# Patient Record
Sex: FEMALE | Race: WHITE | HISPANIC OR LATINO
[De-identification: names, ages, dates, MRNs, and addresses within clinical notes are randomized per-mention and may not be internally consistent; named-entity substitution may affect disease eponyms.]

---

## 2022-06-01 ENCOUNTER — APPOINTMENT (OUTPATIENT)
Dept: UROLOGY | Facility: CLINIC | Age: 77
End: 2022-06-01
Payer: MEDICARE

## 2022-06-01 VITALS
WEIGHT: 180 LBS | SYSTOLIC BLOOD PRESSURE: 127 MMHG | BODY MASS INDEX: 25.77 KG/M2 | DIASTOLIC BLOOD PRESSURE: 79 MMHG | RESPIRATION RATE: 16 BRPM | HEART RATE: 85 BPM | TEMPERATURE: 97.2 F | OXYGEN SATURATION: 98 % | HEIGHT: 70 IN

## 2022-06-01 PROCEDURE — 99204 OFFICE O/P NEW MOD 45 MIN: CPT

## 2022-06-01 RX ORDER — SULFAMETHOXAZOLE AND TRIMETHOPRIM 800; 160 MG/1; MG/1
800-160 TABLET ORAL
Qty: 6 | Refills: 0 | Status: ACTIVE | COMMUNITY
Start: 2022-06-01 | End: 1900-01-01

## 2022-06-01 NOTE — ASSESSMENT
[FreeTextEntry1] : Ms Díaz is a 76 y.o. F with LUTS, worse in the past 1 week.\par Dipstick UA positive for infection.\par Regarding her baseline symptoms, we discussed the following options for managing the patient's lower urinary tract symptoms (LUTS) attributed to overactive bladder (OAB)\par (1) Behavioral modification: timed and double voiding, reduced oral fluid intake at night, avoid bladder irritants (caffeine, alcohol, smoking, spicy foods), bladder training\par (2) Medical therapy: Anticholinergics or B-3 agonists. Discussed mechanisms of action and side effect profile\par (3) Procedures: Botox, PTNS, interstim\par  \par - UA, UCx\par - Empiric bactrim x 3 days based on positive dipstick UA, symptoms\par - Trospium 20mg prescribed. Asked her to start ~3 days following completion of antibiotics. Side effect profile reviewed. Discussed this is a quaternary amine and therefore less likely to penetrate blood brain barrier\par - RV 4-6 weeks

## 2022-06-01 NOTE — LETTER BODY
[Dear  ___] : Dear  [unfilled], [Consult Letter:] : I had the pleasure of evaluating your patient, [unfilled]. [Please see my note below.] : Please see my note below. [Consult Closing:] : Thank you very much for allowing me to participate in the care of this patient.  If you have any questions, please do not hesitate to contact me. [Sincerely,] : Sincerely, [FreeTextEntry2] : Gal Noel MD\par [address]\par [facility], [place]\par [address] [FreeTextEntry3] : Denzel Balderas MD\par [facility]\par [address] [place] [address], [address]\par [place], [place] [address]\par p: [phone]\par f: [phone]

## 2022-06-01 NOTE — HISTORY OF PRESENT ILLNESS
[FreeTextEntry1] : BEV LANDRUM is a 76 year F who presents today as a new patient evaluation for LUTS.\par \par Her symptoms include daytime frequency q2 hours, + urgency with UI. Nocturia x 4. She wears 2 panty-liners / day.  This is been going on for the past 1 year, however things have gotten worse over the past week.  No dysuria.  No gross hematuria.  Did have a UTI approximately 10 years ago.\par She drinks coffee - twice / day. Glass of wine rarely. Nothing else with caffeine.\par Snores a lot. No leg swelling. \par \par PMH: HTN, hypothyroid\par PSH:\par Uterine removal for prolapse, lumpectomy, Knee surgery x 2\par \par Denies gross hematuria, flank pain, fevers, chills, nausea, vomiting.

## 2022-06-01 NOTE — PHYSICAL EXAM
[Normal Appearance] : normal appearance [Well Groomed] : well groomed [Edema] : no peripheral edema [Exaggerated Use Of Accessory Muscles For Inspiration] : no accessory muscle use [Abdomen Tenderness] : non-tender [Costovertebral Angle Tenderness] : no ~M costovertebral angle tenderness [Normal Station and Gait] : the gait and station were normal for the patient's age [Skin Color & Pigmentation] : normal skin color and pigmentation [Sensation] : the sensory exam was normal to light touch and pinprick [Oriented To Time, Place, And Person] : oriented to person, place, and time [No Palpable Adenopathy] : no palpable adenopathy

## 2022-06-05 LAB — BACTERIA UR CULT: ABNORMAL

## 2022-06-05 RX ORDER — CEPHALEXIN 500 MG/1
500 CAPSULE ORAL
Qty: 10 | Refills: 0 | Status: ACTIVE | COMMUNITY
Start: 2022-06-05 | End: 1900-01-01

## 2022-07-13 ENCOUNTER — RX RENEWAL (OUTPATIENT)
Age: 77
End: 2022-07-13

## 2022-07-19 ENCOUNTER — APPOINTMENT (OUTPATIENT)
Dept: UROLOGY | Facility: CLINIC | Age: 77
End: 2022-07-19

## 2022-07-19 VITALS
OXYGEN SATURATION: 96 % | BODY MASS INDEX: 25.77 KG/M2 | HEART RATE: 89 BPM | HEIGHT: 70 IN | SYSTOLIC BLOOD PRESSURE: 140 MMHG | TEMPERATURE: 97.3 F | RESPIRATION RATE: 17 BRPM | WEIGHT: 180 LBS | DIASTOLIC BLOOD PRESSURE: 83 MMHG

## 2022-07-19 PROCEDURE — 99213 OFFICE O/P EST LOW 20 MIN: CPT

## 2022-07-19 NOTE — HISTORY OF PRESENT ILLNESS
[FreeTextEntry1] : Ms Díaz is a 76 y.o. F who presents for follow-up.\par \par She was seen June 5 with LUTS, UTI. She was started on trospium and returns today.\par She was treated for a UTI with keflex\par Nocturia down to x 3, not leaking any more. Denies fevers / chills / nausea / emesis. No episodes of retention. Feels like she is emptying her bladder. \par \par From prior note:\par Her symptoms include daytime frequency q2 hours, + urgency with UI. Nocturia x 4. She wears 2 panty-liners / day. This is been going on for the past 1 year, however things have gotten worse over the past week. No dysuria. No gross hematuria. Did have a UTI approximately 10 years ago.\par She drinks coffee - twice / day. Glass of wine rarely. Nothing else with caffeine.\par Snores a lot. No leg swelling.  baseline numbness unchanged/no headache

## 2022-07-19 NOTE — ASSESSMENT
[FreeTextEntry1] : 76 y.o. F with LUTS, urge incontinence. Now significantly improved on trospium and s/p treatment for UTI\par - UA, UCx\par - PVR low\par - Continue trospium. Again side effect profile reviewed. Discussed this is a quaternary amine and therefore less likely to penetrate blood brain barrier\par - RV 6 months

## 2022-07-19 NOTE — PHYSICAL EXAM
[Normal Appearance] : normal appearance [Well Groomed] : well groomed [Abdomen Tenderness] : non-tender [Costovertebral Angle Tenderness] : no ~M costovertebral angle tenderness [Skin Color & Pigmentation] : normal skin color and pigmentation [Edema] : no peripheral edema [] : no respiratory distress

## 2022-07-20 LAB
APPEARANCE: CLEAR
BACTERIA: NEGATIVE
BILIRUBIN URINE: NEGATIVE
BLOOD URINE: NEGATIVE
COLOR: YELLOW
GLUCOSE QUALITATIVE U: NEGATIVE
HYALINE CASTS: 3 /LPF
KETONES URINE: NEGATIVE
LEUKOCYTE ESTERASE URINE: ABNORMAL
MICROSCOPIC-UA: NORMAL
NITRITE URINE: NEGATIVE
PH URINE: 6
PROTEIN URINE: NEGATIVE
RED BLOOD CELLS URINE: 2 /HPF
SPECIFIC GRAVITY URINE: 1.02
SQUAMOUS EPITHELIAL CELLS: 2 /HPF
UROBILINOGEN URINE: NORMAL
WHITE BLOOD CELLS URINE: 9 /HPF

## 2022-07-24 LAB — BACTERIA UR CULT: ABNORMAL

## 2022-11-08 DIAGNOSIS — N39.0 URINARY TRACT INFECTION, SITE NOT SPECIFIED: ICD-10-CM

## 2022-11-08 LAB
APPEARANCE: ABNORMAL
BACTERIA UR CULT: ABNORMAL
BACTERIA: ABNORMAL
BILIRUBIN URINE: NEGATIVE
BLOOD URINE: ABNORMAL
COLOR: YELLOW
GLUCOSE QUALITATIVE U: NEGATIVE
HYALINE CASTS: 1 /LPF
KETONES URINE: NEGATIVE
LEUKOCYTE ESTERASE URINE: ABNORMAL
MICROSCOPIC-UA: NORMAL
NITRITE URINE: POSITIVE
PH URINE: 7
PROTEIN URINE: ABNORMAL
RED BLOOD CELLS URINE: 3 /HPF
SPECIFIC GRAVITY URINE: 1.02
SQUAMOUS EPITHELIAL CELLS: 1 /HPF
UROBILINOGEN URINE: NORMAL
WHITE BLOOD CELLS URINE: 631 /HPF

## 2022-11-08 RX ORDER — NITROFURANTOIN (MONOHYDRATE/MACROCRYSTALS) 25; 75 MG/1; MG/1
100 CAPSULE ORAL
Qty: 10 | Refills: 0 | Status: ACTIVE | COMMUNITY
Start: 2022-11-08 | End: 1900-01-01

## 2022-12-08 DIAGNOSIS — Z00.00 ENCOUNTER FOR GENERAL ADULT MEDICAL EXAMINATION W/OUT ABNORMAL FINDINGS: ICD-10-CM

## 2022-12-13 ENCOUNTER — APPOINTMENT (OUTPATIENT)
Dept: ORTHOPEDIC SURGERY | Facility: CLINIC | Age: 77
End: 2022-12-13

## 2022-12-13 PROCEDURE — 73564 X-RAY EXAM KNEE 4 OR MORE: CPT | Mod: LT,RT

## 2022-12-13 PROCEDURE — 99204 OFFICE O/P NEW MOD 45 MIN: CPT

## 2022-12-13 NOTE — HISTORY OF PRESENT ILLNESS
[de-identified] : This is very nice 77-year-old female experiencing bilateral knee pain, which is severe in intensity.  History of a left total knee arthroplasty 11/30/2021 at [facility] by Dr. Juan Durand.  This was complicated by patellar dislocations and on February 16, 2022 the patient underwent a patellar clunk surgery with excision of scar and realignment of the extensor mechanism which seems like it was an indication of the extensor mechanism.  This has been further complicated by continued instability of the left knee and dislocation of the patella.  The knee is giving way.  Uses a cane for the last year.  Advil, Tylenol and Aleve and physical therapy have not helped.  Did very well in the past with bilateral total hip arthroplasties at [facility].  The patient denies any radiation of the pain to the feet and it is not associated with numbness, tingling, or weakness.

## 2022-12-13 NOTE — DISCUSSION/SUMMARY
[de-identified] : This patient has severe right knee osteoarthritis and left total knee arthroplasty that is failing secondary to patellar instability and dislocations.   An extensive discussion was conducted on the natural history of the disease and the variety of surgical and non-surgical options available to the patient including, but not limited to non-steroidal anti-inflammatory medications, steroid injections, physical therapy, maintenance of ideal body weight, and reduction of activity.  I recommended a J brace for the left knee.  Physical therapy recommended.  I also prescribed an ESR and CRP to be performed at the [facility] by the patient hopefully today.  This reported infection work-up.  I also ordered a CT scan of the left knee to evaluate for component malalignment and rotation.  The patient will be sent for a CT of the knee left knee. They will notify me when the MRI is complete and we will arrange for another office visit to review the imaging and laboratory values.

## 2022-12-13 NOTE — REASON FOR VISIT
[Initial Visit] : an initial visit for [Artificial Knee Joint] : an artificial knee joint [Knee Pain] : knee pain [Osteoarthritis, Knee] : osteoarthritis of the knee [Family Member] : family member

## 2022-12-13 NOTE — PHYSICAL EXAM
[de-identified] : Patient is well nourished, well-developed, in no acute distress, with appropriate mood and affect. The patient is oriented to time, place, and person. Respirations are even and unlabored. Gait evaluation does reveal a limp. There is no inguinal adenopathy. Bilateral limbs are well-perfused, without skin lesions, shows a grossly normal motor and sensory examination. The right knee motion is significantly reduced and does cause significant pain. The right knee moves from 0 to 125 degrees. The knee is stable within that range-of-motion to AP and ML stress. The alignment of the knee is 5 degrees varus. Muscle strength is normal. Pedal pulses are palpable. Hip examination was negative. The left knee motion is painful and a patellar clunk is noted with the patella subluxating laterally which then spontaneously reduces with knee extension.  Left knee moves from 0-120 degrees. The knee is stable within that range-of-motion to AP and ML stress. The alignment of the knee is neutral.  Well-healed midline surgical scar.  Muscle strength is normal. Pedal pulses are palpable. Hip examination was negative. [de-identified] : Long standing knee, AP knee, lateral knee, and patellar views of the right knee were ordered and taken in the office and demonstrate degenerative joint disease of the knee with joint space narrowing, osteophyte formation, and subchondral sclerosis.\par \par AP, lateral, tunnel, and sunrise knee x-rays of the left knee were ordered and obtained in the office and demonstrate satisfactory position and alignment of the components are present. No signs of loosening are seen.

## 2022-12-15 LAB — CRP SERPL-MCNC: 4 MG/L

## 2022-12-20 ENCOUNTER — OUTPATIENT (OUTPATIENT)
Dept: OUTPATIENT SERVICES | Facility: HOSPITAL | Age: 77
LOS: 1 days | End: 2022-12-20
Payer: COMMERCIAL

## 2022-12-20 ENCOUNTER — APPOINTMENT (OUTPATIENT)
Dept: CT IMAGING | Facility: CLINIC | Age: 77
End: 2022-12-20

## 2022-12-20 DIAGNOSIS — Z96.652 PRESENCE OF LEFT ARTIFICIAL KNEE JOINT: ICD-10-CM

## 2022-12-20 DIAGNOSIS — S83.005A UNSPECIFIED DISLOCATION OF LEFT PATELLA, INITIAL ENCOUNTER: ICD-10-CM

## 2022-12-20 PROCEDURE — 73700 CT LOWER EXTREMITY W/O DYE: CPT | Mod: 26,LT

## 2022-12-20 PROCEDURE — 73700 CT LOWER EXTREMITY W/O DYE: CPT

## 2022-12-27 ENCOUNTER — APPOINTMENT (OUTPATIENT)
Dept: ORTHOPEDIC SURGERY | Facility: CLINIC | Age: 77
End: 2022-12-27

## 2022-12-27 VITALS
SYSTOLIC BLOOD PRESSURE: 143 MMHG | DIASTOLIC BLOOD PRESSURE: 83 MMHG | BODY MASS INDEX: 29.25 KG/M2 | HEART RATE: 90 BPM | WEIGHT: 182 LBS | HEIGHT: 66 IN

## 2022-12-27 DIAGNOSIS — S83.005A UNSPECIFIED DISLOCATION OF LEFT PATELLA, INITIAL ENCOUNTER: ICD-10-CM

## 2022-12-27 PROCEDURE — 99215 OFFICE O/P EST HI 40 MIN: CPT | Mod: 25

## 2022-12-27 PROCEDURE — 20610 DRAIN/INJ JOINT/BURSA W/O US: CPT | Mod: RT

## 2022-12-27 NOTE — REASON FOR VISIT
[Follow-Up Visit] : a follow-up visit for [Artificial Knee Joint] : an artificial knee joint [Knee Pain] : knee pain [Osteoarthritis, Knee] : osteoarthritis of the knee [Family Member] : family member

## 2022-12-27 NOTE — PHYSICAL EXAM
[de-identified] : Patient is well nourished, well-developed, in no acute distress, with appropriate mood and affect. The patient is oriented to time, place, and person. Respirations are even and unlabored. Gait evaluation does reveal a limp. There is no inguinal adenopathy. Bilateral limbs are well-perfused, without skin lesions, shows a grossly normal motor and sensory examination. The right knee motion is significantly reduced and does cause significant pain. The right knee moves from 0 to 125 degrees. The knee is stable within that range-of-motion to AP and ML stress. The alignment of the knee is 5 degrees varus. Muscle strength is normal. Pedal pulses are palpable. Hip examination was negative. The left knee motion is painful and a patellar clunk is noted with the patella subluxating laterally which then spontaneously reduces with knee extension.  Left knee moves from 0-120 degrees. The knee is stable within that range-of-motion to AP and ML stress. The alignment of the knee is neutral.  Well-healed midline surgical scar.  Muscle strength is normal. Pedal pulses are palpable. Hip examination was negative. [de-identified] : Long standing knee, AP knee, lateral knee, and patellar views of the right knee were reviewed with the previous visit and demonstrate degenerative joint disease of the knee with joint space narrowing, osteophyte formation, and subchondral sclerosis.\par \par AP, lateral, tunnel, and sunrise knee x-rays of the left knee were reviewed from the previous visit and demonstrate satisfactory position and alignment of the components are present. No signs of loosening are seen.\par \par The patient brings with her a CT scan of the left knee.  Reviewed the CT scan imaging with the patient which demonstrates a well fixed left total knee arthroplasty with malalignment.  The tibial tray appears to be 20 degrees internal rotation relative to the tibial tubercle and the femur appears to be 6 degrees internally rotated relative to the epicondylar axis.

## 2022-12-27 NOTE — HISTORY OF PRESENT ILLNESS
[de-identified] : This is very nice 77-year-old female experiencing bilateral knee pain, which is severe in intensity.  History of a left total knee arthroplasty 11/30/2021 at [facility] by Dr. Juan Durand.  This was complicated by patellar dislocations and on February 16, 2022 the patient underwent a patellar clunk surgery with excision of scar and realignment of the extensor mechanism which seems like it was an indication of the extensor mechanism.  This has been further complicated by continued instability of the left knee and dislocation of the patella.  The knee is giving way.  Uses a cane for the last year.  Advil, Tylenol and Aleve and physical therapy have not helped.  Did very well in the past with bilateral total hip arthroplasties at [facility].  The patient denies any radiation of the pain to the feet and it is not associated with numbness, tingling, or weakness.  Also has right knee osteoarthritis.  ESR not performed.  CRP 4.  J brace is helping.

## 2022-12-27 NOTE — DISCUSSION/SUMMARY
[de-identified] : This patient has severe right knee osteoarthritis and left total knee arthroplasty that is failing secondary to patellar instability and dislocations.  The left total knee arthroplasty appears to be failing secondary to malposition of the components.  She has failed a course of conservative management for the left knee and would like to proceed with left total knee arthroplasty revision.  No guarantee was made on the ability to prevent patella dislocation.  This is likely because of the internal rotation of the components however she is likely contracted to the lateral side.  I will do my best to try to improve this but I only gave her about 80% chance of improving her patella dislocation issue.  The right knee has osteoarthritis and today we performed a right knee intra-articular cortisone injection.\par \par Informed consent for the right knee injection was obtained. All questions were answered. A time out was performed. The right knee was prepped and draped in sterile fashion. Using sterile technique, the right knee was injected with 80mg of Kenalog, 4cc of 1% lidocaine, 4cc of 0.25% marcaine using a 21-gauge needle. A sterile dressing was applied. Post injection instructions were reviewed. The patient tolerated the procedure well.\par \par The patient is an appropriate candidate for consideration of left revision total knee arthroplasty. This recommendation is based on the patient's pain, function, and bone stock. An extensive discussion was conducted of the natural history of this particular problem and the variety of surgical and non-surgical treatment options available to the patient. A risk/benefit analysis was discussed with the patient reviewing the advantages and disadvantages of surgical intervention at this time. A full explanation was given of the nature and the purpose of the procedure and anesthesia, its benefits, possible alternative methods of diagnosis or treatment, the risks involved, the possibility of complications, the foreseeable consequences of the procedure and the possible results of the non-treatment. I reviewed the plan of care and I also used a model of a revision joint replacement implant equivalent to the one that will be used for their revision total joint replacement. The ability to secure the implant utilizing cement or cementless (press-fit) was discussed with the patient. The patient agrees with the plan of care, as well as the use of implants for their revision joint replacement. \par \par No guarantee or assurance was made as to the results that may be obtained. Specifically, the risks were identified to include, but are not limited to the following: Infection, phlebitis, pulmonary embolism, death, paralysis, dislocation, pain, stiffness, instability, limp, weakness, breakage, leg-length inequality, uncontrolled bleeding, nerve injury, blood vessel injury, pressure sores, anesthetic risks, delayed healing of wound and bone, and wear and loosening. Further discussion was undertaken with the patient about the details of surgical preparation, treatment, and postoperative rehabilitation including medical clearance, autotransfusion, the hospital course, and the postoperative rehabilitation involved. Reimplantation may require cemented or cementless components, or both, depending upon a variety of factors that must be assessed at the time of surgery. The need for bone graft (either autograft or allograft) to enhance the chance for success of the procedure(s) was discussed. All in all, I feel that this patient is a good candidate for surgical reconstruction.\par \par The patient and I discussed the current SARS-CoV-2 (COVID-19) pandemic which has affected our local hospitals. We discussed that our hospitals treat patients with COVID-19. All efforts will be made to avoid cohorting the patient with diagnosed or suspected COVID-19 patient. They also understand that we will screen them 24-48 hours prior to surgery. Despite our best efforts, there is a potential risk for iatrogenic transmission of COVID-19 to the patient during the perioperative period. Aubree COVID-19 during the perioperative period may increase the patient´s risks of an adverse outcome including postoperative pneumonia, difficulty breathing, requirement for a breathing tube (general endotracheal intubation), and death. The patient is understanding of this risk, and is willing to proceed with surgery at this time.
HR controlled  continue anticoagulation

## 2023-01-19 ENCOUNTER — APPOINTMENT (OUTPATIENT)
Dept: UROLOGY | Facility: CLINIC | Age: 78
End: 2023-01-19

## 2023-04-18 ENCOUNTER — APPOINTMENT (OUTPATIENT)
Dept: ORTHOPEDIC SURGERY | Facility: CLINIC | Age: 78
End: 2023-04-18
Payer: MEDICARE

## 2023-04-18 VITALS
WEIGHT: 180 LBS | DIASTOLIC BLOOD PRESSURE: 81 MMHG | OXYGEN SATURATION: 97 % | SYSTOLIC BLOOD PRESSURE: 132 MMHG | BODY MASS INDEX: 28.93 KG/M2 | TEMPERATURE: 97.3 F | HEIGHT: 66 IN | HEART RATE: 80 BPM

## 2023-04-18 PROCEDURE — 73564 X-RAY EXAM KNEE 4 OR MORE: CPT | Mod: LT

## 2023-04-18 PROCEDURE — 99215 OFFICE O/P EST HI 40 MIN: CPT | Mod: 25

## 2023-04-18 PROCEDURE — 20610 DRAIN/INJ JOINT/BURSA W/O US: CPT | Mod: RT

## 2023-04-18 PROCEDURE — 73560 X-RAY EXAM OF KNEE 1 OR 2: CPT | Mod: RT

## 2023-04-18 NOTE — DISCUSSION/SUMMARY
[de-identified] : This patient has severe right knee osteoarthritis and left total knee arthroplasty that is failing secondary to patellar instability and dislocations.  The left total knee arthroplasty is failed secondary to malposition of the components.  She has failed a course of conservative management for the left knee and would like to proceed with left total knee arthroplasty revision.  No guarantee was made on the ability to prevent patella dislocation.  This is likely because of the internal rotation of the components however she is likely contracted to the lateral side.  I will do my best to try to improve this but I only gave her about 80% chance of improving her patella dislocation issue.  The right knee has osteoarthritis and today we performed a right knee intra-articular cortisone injection.\par \par Informed consent for the right knee injection was obtained. All questions were answered. A time out was performed. The right knee was prepped and draped in sterile fashion. Using sterile technique, the right knee was injected with 80mg of Kenalog, 4cc of 1% lidocaine, 4cc of 0.25% marcaine using a 21-gauge needle. A sterile dressing was applied. Post injection instructions were reviewed. The patient tolerated the procedure well.\par \par The patient is an appropriate candidate for consideration of left revision total knee arthroplasty. This recommendation is based on the patient's pain, function, and bone stock. An extensive discussion was conducted of the natural history of this particular problem and the variety of surgical and non-surgical treatment options available to the patient. A risk/benefit analysis was discussed with the patient reviewing the advantages and disadvantages of surgical intervention at this time. A full explanation was given of the nature and the purpose of the procedure and anesthesia, its benefits, possible alternative methods of diagnosis or treatment, the risks involved, the possibility of complications, the foreseeable consequences of the procedure and the possible results of the non-treatment. I reviewed the plan of care and I also used a model of a revision joint replacement implant equivalent to the one that will be used for their revision total joint replacement. The ability to secure the implant utilizing cement or cementless (press-fit) was discussed with the patient. The patient agrees with the plan of care, as well as the use of implants for their revision joint replacement. \par \par No guarantee or assurance was made as to the results that may be obtained. Specifically, the risks were identified to include, but are not limited to the following: Infection, phlebitis, pulmonary embolism, death, paralysis, dislocation, pain, stiffness, instability, limp, weakness, breakage, leg-length inequality, uncontrolled bleeding, nerve injury, blood vessel injury, pressure sores, anesthetic risks, delayed healing of wound and bone, and wear and loosening. Further discussion was undertaken with the patient about the details of surgical preparation, treatment, and postoperative rehabilitation including medical clearance, autotransfusion, the hospital course, and the postoperative rehabilitation involved. Reimplantation may require cemented or cementless components, or both, depending upon a variety of factors that must be assessed at the time of surgery. The need for bone graft (either autograft or allograft) to enhance the chance for success of the procedure(s) was discussed. All in all, I feel that this patient is a good candidate for surgical reconstruction.\par \par The patient and I discussed the current SARS-CoV-2 (COVID-19) pandemic which has affected our local hospitals. We discussed that our hospitals treat patients with COVID-19. All efforts will be made to avoid cohorting the patient with diagnosed or suspected COVID-19 patient. They also understand that we will screen them 24-48 hours prior to surgery. Despite our best efforts, there is a potential risk for iatrogenic transmission of COVID-19 to the patient during the perioperative period. Aubree COVID-19 during the perioperative period may increase the patient´s risks of an adverse outcome including postoperative pneumonia, difficulty breathing, requirement for a breathing tube (general endotracheal intubation), and death. The patient is understanding of this risk, and is willing to proceed with surgery at this time.

## 2023-04-18 NOTE — PHYSICAL EXAM
[de-identified] : Patient is well nourished, well-developed, in no acute distress, with appropriate mood and affect. The patient is oriented to time, place, and person. Respirations are even and unlabored. Gait evaluation does reveal a limp. There is no inguinal adenopathy. Bilateral limbs are well-perfused, without skin lesions, shows a grossly normal motor and sensory examination. The right knee motion is significantly reduced and does cause significant pain. The right knee moves from 0 to 125 degrees. The knee is stable within that range-of-motion to AP and ML stress. The alignment of the knee is 5 degrees varus. Muscle strength is normal. Pedal pulses are palpable. Hip examination was negative. The left knee motion is painful and a patellar clunk is noted with the patella subluxating laterally which then spontaneously reduces with knee extension.  Left knee moves from 0-120 degrees. The knee is stable within that range-of-motion to AP and ML stress. The alignment of the knee is neutral.  Well-healed midline surgical scar.  Muscle strength is normal. Pedal pulses are palpable. Hip examination was negative. [de-identified] : AP, lateral, tunnel, and sunrise knee x-rays of the left knee were ordered and obtained in the office and demonstrate satisfactory position and alignment of the components are present. No signs of loosening are seen. AP radiograph of the right knee was ordered and obtained in the office and demonstrates right knee OA.\par \par The patient brings with her a CT scan of the left knee.  Reviewed the CT scan imaging with the patient which demonstrates a well fixed left total knee arthroplasty with malalignment.  The tibial tray appears to be 20 degrees internal rotation relative to the tibial tubercle and the femur appears to be 6 degrees internally rotated relative to the epicondylar axis.

## 2023-04-18 NOTE — HISTORY OF PRESENT ILLNESS
[de-identified] : This is very nice 77-year-old female experiencing bilateral knee pain, which is severe in intensity.  History of a left total knee arthroplasty 11/30/2021 at [facility] by Dr. Juan Durand.  This was complicated by patellar dislocations and on February 16, 2022 the patient underwent a patellar clunk surgery with excision of scar and realignment of the extensor mechanism which seems like it was an indication of the extensor mechanism.  This has been further complicated by continued instability of the left knee and dislocation of the patella.  The knee is giving way.  Uses a cane for the last year.  Advil, Tylenol and Aleve and physical therapy have not helped.  Did very well in the past with bilateral total hip arthroplasties at [facility].  The patient denies any radiation of the pain to the feet and it is not associated with numbness, tingling, or weakness.  Also has right knee osteoarthritis.  J brace is helping.

## 2023-04-27 ENCOUNTER — OUTPATIENT (OUTPATIENT)
Dept: OUTPATIENT SERVICES | Facility: HOSPITAL | Age: 78
LOS: 1 days | End: 2023-04-27
Payer: COMMERCIAL

## 2023-04-27 ENCOUNTER — APPOINTMENT (OUTPATIENT)
Dept: CT IMAGING | Facility: CLINIC | Age: 78
End: 2023-04-27
Payer: MEDICARE

## 2023-04-27 DIAGNOSIS — Z96.652 PRESENCE OF LEFT ARTIFICIAL KNEE JOINT: ICD-10-CM

## 2023-04-27 PROCEDURE — 73700 CT LOWER EXTREMITY W/O DYE: CPT

## 2023-04-27 PROCEDURE — 73700 CT LOWER EXTREMITY W/O DYE: CPT | Mod: 26,LT

## 2023-05-01 ENCOUNTER — OUTPATIENT (OUTPATIENT)
Dept: OUTPATIENT SERVICES | Facility: HOSPITAL | Age: 78
LOS: 1 days | End: 2023-05-01
Payer: COMMERCIAL

## 2023-05-01 VITALS
WEIGHT: 179.9 LBS | DIASTOLIC BLOOD PRESSURE: 86 MMHG | HEART RATE: 91 BPM | OXYGEN SATURATION: 96 % | HEIGHT: 66 IN | TEMPERATURE: 98 F | RESPIRATION RATE: 18 BRPM | SYSTOLIC BLOOD PRESSURE: 133 MMHG

## 2023-05-01 DIAGNOSIS — Z90.49 ACQUIRED ABSENCE OF OTHER SPECIFIED PARTS OF DIGESTIVE TRACT: Chronic | ICD-10-CM

## 2023-05-01 DIAGNOSIS — M19.90 UNSPECIFIED OSTEOARTHRITIS, UNSPECIFIED SITE: ICD-10-CM

## 2023-05-01 DIAGNOSIS — D25.9 LEIOMYOMA OF UTERUS, UNSPECIFIED: ICD-10-CM

## 2023-05-01 DIAGNOSIS — C50.911 MALIGNANT NEOPLASM OF UNSPECIFIED SITE OF RIGHT FEMALE BREAST: Chronic | ICD-10-CM

## 2023-05-01 DIAGNOSIS — Z98.890 OTHER SPECIFIED POSTPROCEDURAL STATES: Chronic | ICD-10-CM

## 2023-05-01 DIAGNOSIS — Z29.9 ENCOUNTER FOR PROPHYLACTIC MEASURES, UNSPECIFIED: ICD-10-CM

## 2023-05-01 DIAGNOSIS — Z90.710 ACQUIRED ABSENCE OF BOTH CERVIX AND UTERUS: Chronic | ICD-10-CM

## 2023-05-01 DIAGNOSIS — Z96.652 PRESENCE OF LEFT ARTIFICIAL KNEE JOINT: Chronic | ICD-10-CM

## 2023-05-01 DIAGNOSIS — Z96.643 PRESENCE OF ARTIFICIAL HIP JOINT, BILATERAL: Chronic | ICD-10-CM

## 2023-05-01 DIAGNOSIS — Z90.89 ACQUIRED ABSENCE OF OTHER ORGANS: Chronic | ICD-10-CM

## 2023-05-01 LAB
A1C WITH ESTIMATED AVERAGE GLUCOSE RESULT: 6 % — HIGH (ref 4–5.6)
ANION GAP SERPL CALC-SCNC: 11 MMOL/L — SIGNIFICANT CHANGE UP (ref 5–17)
BLD GP AB SCN SERPL QL: NEGATIVE — SIGNIFICANT CHANGE UP
BUN SERPL-MCNC: 28 MG/DL — HIGH (ref 7–23)
CALCIUM SERPL-MCNC: 9.7 MG/DL — SIGNIFICANT CHANGE UP (ref 8.4–10.5)
CHLORIDE SERPL-SCNC: 104 MMOL/L — SIGNIFICANT CHANGE UP (ref 96–108)
CO2 SERPL-SCNC: 25 MMOL/L — SIGNIFICANT CHANGE UP (ref 22–31)
CREAT SERPL-MCNC: 0.89 MG/DL — SIGNIFICANT CHANGE UP (ref 0.5–1.3)
EGFR: 67 ML/MIN/1.73M2 — SIGNIFICANT CHANGE UP
ESTIMATED AVERAGE GLUCOSE: 126 MG/DL — HIGH (ref 68–114)
GLUCOSE SERPL-MCNC: 99 MG/DL — SIGNIFICANT CHANGE UP (ref 70–99)
HCT VFR BLD CALC: 40.3 % — SIGNIFICANT CHANGE UP (ref 34.5–45)
HGB BLD-MCNC: 12.9 G/DL — SIGNIFICANT CHANGE UP (ref 11.5–15.5)
MCHC RBC-ENTMCNC: 28.4 PG — SIGNIFICANT CHANGE UP (ref 27–34)
MCHC RBC-ENTMCNC: 32 GM/DL — SIGNIFICANT CHANGE UP (ref 32–36)
MCV RBC AUTO: 88.6 FL — SIGNIFICANT CHANGE UP (ref 80–100)
MRSA PCR RESULT.: SIGNIFICANT CHANGE UP
NRBC # BLD: 0 /100 WBCS — SIGNIFICANT CHANGE UP (ref 0–0)
PLATELET # BLD AUTO: 180 K/UL — SIGNIFICANT CHANGE UP (ref 150–400)
POTASSIUM SERPL-MCNC: 3.9 MMOL/L — SIGNIFICANT CHANGE UP (ref 3.5–5.3)
POTASSIUM SERPL-SCNC: 3.9 MMOL/L — SIGNIFICANT CHANGE UP (ref 3.5–5.3)
RBC # BLD: 4.55 M/UL — SIGNIFICANT CHANGE UP (ref 3.8–5.2)
RBC # FLD: 14.9 % — HIGH (ref 10.3–14.5)
RH IG SCN BLD-IMP: NEGATIVE — SIGNIFICANT CHANGE UP
S AUREUS DNA NOSE QL NAA+PROBE: SIGNIFICANT CHANGE UP
SODIUM SERPL-SCNC: 140 MMOL/L — SIGNIFICANT CHANGE UP (ref 135–145)
WBC # BLD: 7.13 K/UL — SIGNIFICANT CHANGE UP (ref 3.8–10.5)
WBC # FLD AUTO: 7.13 K/UL — SIGNIFICANT CHANGE UP (ref 3.8–10.5)

## 2023-05-01 PROCEDURE — 85027 COMPLETE CBC AUTOMATED: CPT

## 2023-05-01 PROCEDURE — 87640 STAPH A DNA AMP PROBE: CPT

## 2023-05-01 PROCEDURE — G0463: CPT

## 2023-05-01 PROCEDURE — 86850 RBC ANTIBODY SCREEN: CPT

## 2023-05-01 PROCEDURE — 36415 COLL VENOUS BLD VENIPUNCTURE: CPT

## 2023-05-01 PROCEDURE — 87641 MR-STAPH DNA AMP PROBE: CPT

## 2023-05-01 PROCEDURE — 86900 BLOOD TYPING SEROLOGIC ABO: CPT

## 2023-05-01 PROCEDURE — 86901 BLOOD TYPING SEROLOGIC RH(D): CPT

## 2023-05-01 PROCEDURE — 80048 BASIC METABOLIC PNL TOTAL CA: CPT

## 2023-05-01 PROCEDURE — 83036 HEMOGLOBIN GLYCOSYLATED A1C: CPT

## 2023-05-01 RX ORDER — LIDOCAINE HCL 20 MG/ML
0.2 VIAL (ML) INJECTION ONCE
Refills: 0 | Status: DISCONTINUED | OUTPATIENT
Start: 2023-05-22 | End: 2023-05-22

## 2023-05-01 RX ORDER — TRAMADOL HYDROCHLORIDE 50 MG/1
50 TABLET ORAL ONCE
Refills: 0 | Status: DISCONTINUED | OUTPATIENT
Start: 2023-05-22 | End: 2023-05-22

## 2023-05-01 RX ORDER — PANTOPRAZOLE SODIUM 20 MG/1
40 TABLET, DELAYED RELEASE ORAL ONCE
Refills: 0 | Status: COMPLETED | OUTPATIENT
Start: 2023-05-22 | End: 2023-05-22

## 2023-05-01 RX ORDER — SODIUM CHLORIDE 9 MG/ML
3 INJECTION INTRAMUSCULAR; INTRAVENOUS; SUBCUTANEOUS EVERY 8 HOURS
Refills: 0 | Status: DISCONTINUED | OUTPATIENT
Start: 2023-05-22 | End: 2023-05-22

## 2023-05-01 RX ORDER — CHLORHEXIDINE GLUCONATE 213 G/1000ML
1 SOLUTION TOPICAL ONCE
Refills: 0 | Status: DISCONTINUED | OUTPATIENT
Start: 2023-05-22 | End: 2023-05-22

## 2023-05-01 NOTE — H&P PST ADULT - NSICDXPASTSURGICALHX_GEN_ALL_CORE_FT
PAST SURGICAL HISTORY:  H/O bilateral hip replacements     H/O total knee replacement, left     History of appendectomy     History of lumpectomy     History of tonsillectomy     S/P hysterectomy

## 2023-05-01 NOTE — H&P PST ADULT - HISTORY OF PRESENT ILLNESS
This is a 77 year old female with past medical history of HTN, HLD, hypothyroidism Right breast cancer s/p radation tx and lumpectomy (2017, receives annually mammos)          CT MICHELLE performed on 4/27 @ [address] This is a 77 year old female with past medical history of HTN, HLD, hypothyroidism, Right breast cancer s/p radation tx and lumpectomy (2017, receives annually mammos). History of left knee arthroplasty 11/2021 at [facility] with patellar dislocation Feb 2022 s/p surgery for realignment. Pt still reports 5 out of 10 chrnic knee pain despite PT and pain medications. Received steroidal injection with surgeon 4/18/23. Today presenting to PST for scheduled Left total knee arthroplasty revision w. robot assist whitney MARTINEZ on 5/22/23 with Dr. Mary MARTINEZ performed on 4/27 @ [address]

## 2023-05-01 NOTE — H&P PST ADULT - PROBLEM SELECTOR PLAN 1
Preop instructions and chlorhexidine soap given  Lab CBC BMP A1c T&S MRSA performed in PST  Medical eval done on 4/28  CT MICHELLE done on 4/27

## 2023-05-01 NOTE — H&P PST ADULT - ASSESSMENT
DASI Score: 6  DASI Activity: Self care, ambulates with cane, some actvites limited due to left knee pain  Loose or removable teeth: denies        CAPRINI SCORE    AGE RELATED RISK FACTORS                                                       MOBILITY RELATED FACTORS  [ ] Age 41-60 years                                            (1 Point)                  [ ] Bed rest                                                        (1 Point)  [ ] Age: 61-74 years                                           (2 Points)                [ ] Plaster cast                                                   (2 Points)  [ ] Age= 75 years                                              (3 Points)                 [ ] Bed bound for more than 72 hours                   (2 Points)    DISEASE RELATED RISK FACTORS                                               GENDER SPECIFIC FACTORS  [ ] Edema in the lower extremities                       (1 Point)                  [ ] Pregnancy                                                     (1 Point)  [ ] Varicose veins                                               (1 Point)                  [ ] Post-partum < 6 weeks                                   (1 Point)             [ ] BMI > 25 Kg/m2                                            (1 Point)                  [ ] Hormonal therapy  or oral contraception            (1 Point)                 [ ] Sepsis (in the previous month)                        (1 Point)                  [ ] History of pregnancy complications  [ ] Pneumonia or serious lung disease                                               [ ] Unexplained or recurrent                       (1 Point)           (in the previous month)                               (1 Point)  [ ] Abnormal pulmonary function test                     (1 Point)                 SURGERY RELATED RISK FACTORS  [ ] Acute myocardial infarction                              (1 Point)                 [ ]  Section                                            (1 Point)  [ ] Congestive heart failure (in the previous month)  (1 Point)                 [ ] Minor surgery                                                 (1 Point)   [ ] Inflammatory bowel disease                             (1 Point)                 [ ] Arthroscopic surgery                                        (2 Points)  [ ] Central venous access                                    (2 Points)                [ ] General surgery lasting more than 45 minutes   (2 Points)       [ ] Stroke (in the previous month)                          (5 Points)               [ ] Elective arthroplasty                                        (5 Points)                                                                                                                                               HEMATOLOGY RELATED FACTORS                                                 TRAUMA RELATED RISK FACTORS  [ ] Prior episodes of VTE                                     (3 Points)                 [ ] Fracture of the hip, pelvis, or leg                       (5 Points)  [ ] Positive family history for VTE                         (3 Points)                 [ ] Acute spinal cord injury (in the previous month)  (5 Points)  [ ] Prothrombin 30970 A                                      (3 Points)                 [ ] Paralysis  (less than 1 month)                          (5 Points)  [ ] Factor V Leiden                                             (3 Points)                 [ ] Multiple Trauma within 1 month                         (5 Points)  [ ] Lupus anticoagulants                                     (3 Points)                                                           [ ] Anticardiolipin antibodies                                (3 Points)                                                       [ ] High homocysteine in the blood                      (3 Points)                                             [ ] Other congenital or acquired thrombophilia       (3 Points)                                                [ ] Heparin induced thrombocytopenia                  (3 Points)                                          Total Score [          ] DASI Score: 5.27  DASI Activity: Self care, ambulates with cane, some actvites limited due to left knee pain  Loose or removable teeth: denies        CAPRINI SCORE    AGE RELATED RISK FACTORS                                                       MOBILITY RELATED FACTORS  [ ] Age 41-60 years                                            (1 Point)                  [ ] Bed rest                                                        (1 Point)  [ ] Age: 61-74 years                                           (2 Points)                [ ] Plaster cast                                                   (2 Points)  [x ] Age= 75 years                                              (3 Points)                 [ ] Bed bound for more than 72 hours                   (2 Points)    DISEASE RELATED RISK FACTORS                                               GENDER SPECIFIC FACTORS  [ ] Edema in the lower extremities                       (1 Point)                  [ ] Pregnancy                                                     (1 Point)  [ ] Varicose veins                                               (1 Point)                  [ ] Post-partum < 6 weeks                                   (1 Point)             [x ] BMI > 25 Kg/m2                                            (1 Point)                  [ ] Hormonal therapy  or oral contraception            (1 Point)                 [ ] Sepsis (in the previous month)                        (1 Point)                  [ ] History of pregnancy complications  [ ] Pneumonia or serious lung disease                                               [ ] Unexplained or recurrent                       (1 Point)           (in the previous month)                               (1 Point)  [ ] Abnormal pulmonary function test                     (1 Point)                 SURGERY RELATED RISK FACTORS  [ ] Acute myocardial infarction                              (1 Point)                 [ ]  Section                                            (1 Point)  [ ] Congestive heart failure (in the previous month)  (1 Point)                 [ ] Minor surgery                                                 (1 Point)   [ ] Inflammatory bowel disease                             (1 Point)                 [ ] Arthroscopic surgery                                        (2 Points)  [ ] Central venous access                                    (2 Points)                [ ] General surgery lasting more than 45 minutes   (2 Points)       [ ] Stroke (in the previous month)                          (5 Points)               [x ] Elective arthroplasty                                        (5 Points)                                                                                                                                               HEMATOLOGY RELATED FACTORS                                                 TRAUMA RELATED RISK FACTORS  [ ] Prior episodes of VTE                                     (3 Points)                 [ ] Fracture of the hip, pelvis, or leg                       (5 Points)  [ ] Positive family history for VTE                         (3 Points)                 [ ] Acute spinal cord injury (in the previous month)  (5 Points)  [ ] Prothrombin 36483 A                                      (3 Points)                 [ ] Paralysis  (less than 1 month)                          (5 Points)  [ ] Factor V Leiden                                             (3 Points)                 [ ] Multiple Trauma within 1 month                         (5 Points)  [ ] Lupus anticoagulants                                     (3 Points)                                                           [ ] Anticardiolipin antibodies                                (3 Points)                                                       [ ] High homocysteine in the blood                      (3 Points)                                             [ ] Other congenital or acquired thrombophilia       (3 Points)                                                [ ] Heparin induced thrombocytopenia                  (3 Points)                                          Total Score [   9 ]

## 2023-05-01 NOTE — H&P PST ADULT - NSICDXPASTMEDICALHX_GEN_ALL_CORE_FT
PAST MEDICAL HISTORY:  Breast cancer, right     HLD (hyperlipidemia)     HTN (hypertension)     Hypothyroidism     OA (osteoarthritis)

## 2023-05-01 NOTE — H&P PST ADULT - NSANTHOSAYNRD_GEN_A_CORE
No. LUDIVINA screening performed.  STOP BANG Legend: 0-2 = LOW Risk; 3-4 = INTERMEDIATE Risk; 5-8 = HIGH Risk

## 2023-05-03 ENCOUNTER — NON-APPOINTMENT (OUTPATIENT)
Age: 78
End: 2023-05-03

## 2023-05-21 ENCOUNTER — TRANSCRIPTION ENCOUNTER (OUTPATIENT)
Age: 78
End: 2023-05-21

## 2023-05-22 ENCOUNTER — TRANSCRIPTION ENCOUNTER (OUTPATIENT)
Age: 78
End: 2023-05-22

## 2023-05-22 ENCOUNTER — INPATIENT (INPATIENT)
Facility: HOSPITAL | Age: 78
LOS: 2 days | Discharge: HOME HEALTH | DRG: 468 | End: 2023-05-25
Attending: ORTHOPAEDIC SURGERY | Admitting: ORTHOPAEDIC SURGERY
Payer: COMMERCIAL

## 2023-05-22 ENCOUNTER — APPOINTMENT (OUTPATIENT)
Dept: ORTHOPEDIC SURGERY | Facility: HOSPITAL | Age: 78
End: 2023-05-22

## 2023-05-22 VITALS
OXYGEN SATURATION: 95 % | WEIGHT: 179.9 LBS | SYSTOLIC BLOOD PRESSURE: 123 MMHG | RESPIRATION RATE: 18 BRPM | HEIGHT: 66 IN | HEART RATE: 80 BPM | DIASTOLIC BLOOD PRESSURE: 84 MMHG | TEMPERATURE: 99 F

## 2023-05-22 DIAGNOSIS — Z90.710 ACQUIRED ABSENCE OF BOTH CERVIX AND UTERUS: Chronic | ICD-10-CM

## 2023-05-22 DIAGNOSIS — Z98.890 OTHER SPECIFIED POSTPROCEDURAL STATES: Chronic | ICD-10-CM

## 2023-05-22 DIAGNOSIS — T84.023: ICD-10-CM

## 2023-05-22 DIAGNOSIS — Z90.89 ACQUIRED ABSENCE OF OTHER ORGANS: Chronic | ICD-10-CM

## 2023-05-22 DIAGNOSIS — T84.023D INSTABILITY OF INTERNAL LEFT KNEE PROSTHESIS, SUBSEQUENT ENCOUNTER: ICD-10-CM

## 2023-05-22 DIAGNOSIS — Z96.652 PRESENCE OF LEFT ARTIFICIAL KNEE JOINT: Chronic | ICD-10-CM

## 2023-05-22 DIAGNOSIS — Z96.643 PRESENCE OF ARTIFICIAL HIP JOINT, BILATERAL: Chronic | ICD-10-CM

## 2023-05-22 DIAGNOSIS — Z90.49 ACQUIRED ABSENCE OF OTHER SPECIFIED PARTS OF DIGESTIVE TRACT: Chronic | ICD-10-CM

## 2023-05-22 PROCEDURE — 20985 CPTR-ASST DIR MS PX: CPT

## 2023-05-22 PROCEDURE — 27487 REVISE/REPLACE KNEE JOINT: CPT | Mod: LT

## 2023-05-22 PROCEDURE — 27447 TOTAL KNEE ARTHROPLASTY: CPT | Mod: 82,LT

## 2023-05-22 PROCEDURE — 73560 X-RAY EXAM OF KNEE 1 OR 2: CPT | Mod: 26,LT

## 2023-05-22 DEVICE — IMPLANTABLE DEVICE: Type: IMPLANTABLE DEVICE | Site: LEFT | Status: FUNCTIONAL

## 2023-05-22 DEVICE — CEMENT SIMPLEX P 40GM: Type: IMPLANTABLE DEVICE | Site: LEFT | Status: FUNCTIONAL

## 2023-05-22 DEVICE — STEM CMNTD TRIATHLON TS 12X50MM: Type: IMPLANTABLE DEVICE | Site: LEFT | Status: FUNCTIONAL

## 2023-05-22 DEVICE — MAKO BONE PIN 3.2MM X 140MM: Type: IMPLANTABLE DEVICE | Site: LEFT | Status: FUNCTIONAL

## 2023-05-22 DEVICE — AUGMENT TIBIAL SYMMETRIC CONE TRIATHLON SZ A: Type: IMPLANTABLE DEVICE | Site: LEFT | Status: FUNCTIONAL

## 2023-05-22 DEVICE — EXTENDER STEM TRIATHLON 25MM: Type: IMPLANTABLE DEVICE | Site: LEFT | Status: FUNCTIONAL

## 2023-05-22 DEVICE — MAKO BONE PIN 3.2MM X 110MM: Type: IMPLANTABLE DEVICE | Site: LEFT | Status: FUNCTIONAL

## 2023-05-22 DEVICE — BASEPLATE TIB UNIV TRIATHLON SZ 2: Type: IMPLANTABLE DEVICE | Site: LEFT | Status: FUNCTIONAL

## 2023-05-22 RX ORDER — ACETAMINOPHEN 500 MG
1000 TABLET ORAL ONCE
Refills: 0 | Status: COMPLETED | OUTPATIENT
Start: 2023-05-23 | End: 2023-05-23

## 2023-05-22 RX ORDER — OXYCODONE HYDROCHLORIDE 5 MG/1
5 TABLET ORAL EVERY 4 HOURS
Refills: 0 | Status: DISCONTINUED | OUTPATIENT
Start: 2023-05-22 | End: 2023-05-25

## 2023-05-22 RX ORDER — SIMVASTATIN 20 MG/1
1 TABLET, FILM COATED ORAL
Refills: 0 | DISCHARGE

## 2023-05-22 RX ORDER — LEVOTHYROXINE SODIUM 125 MCG
75 TABLET ORAL DAILY
Refills: 0 | Status: DISCONTINUED | OUTPATIENT
Start: 2023-05-23 | End: 2023-05-25

## 2023-05-22 RX ORDER — APIXABAN 2.5 MG/1
2.5 TABLET, FILM COATED ORAL EVERY 12 HOURS
Refills: 0 | Status: DISCONTINUED | OUTPATIENT
Start: 2023-05-23 | End: 2023-05-25

## 2023-05-22 RX ORDER — ATORVASTATIN CALCIUM 80 MG/1
40 TABLET, FILM COATED ORAL AT BEDTIME
Refills: 0 | Status: DISCONTINUED | OUTPATIENT
Start: 2023-05-23 | End: 2023-05-25

## 2023-05-22 RX ORDER — ACETAMINOPHEN 500 MG
975 TABLET ORAL EVERY 8 HOURS
Refills: 0 | Status: DISCONTINUED | OUTPATIENT
Start: 2023-05-23 | End: 2023-05-25

## 2023-05-22 RX ORDER — OXYCODONE HYDROCHLORIDE 5 MG/1
10 TABLET ORAL EVERY 4 HOURS
Refills: 0 | Status: DISCONTINUED | OUTPATIENT
Start: 2023-05-22 | End: 2023-05-25

## 2023-05-22 RX ORDER — POLYETHYLENE GLYCOL 3350 17 G/17G
17 POWDER, FOR SOLUTION ORAL AT BEDTIME
Refills: 0 | Status: DISCONTINUED | OUTPATIENT
Start: 2023-05-23 | End: 2023-05-25

## 2023-05-22 RX ORDER — TROSPIUM CHLORIDE 20 MG/1
1 TABLET, FILM COATED ORAL
Refills: 0 | DISCHARGE

## 2023-05-22 RX ORDER — OXYBUTYNIN CHLORIDE 5 MG
5 TABLET ORAL
Refills: 0 | Status: DISCONTINUED | OUTPATIENT
Start: 2023-05-23 | End: 2023-05-25

## 2023-05-22 RX ORDER — LISINOPRIL 2.5 MG/1
1 TABLET ORAL
Refills: 0 | DISCHARGE

## 2023-05-22 RX ORDER — PANTOPRAZOLE SODIUM 20 MG/1
40 TABLET, DELAYED RELEASE ORAL
Refills: 0 | Status: DISCONTINUED | OUTPATIENT
Start: 2023-05-23 | End: 2023-05-25

## 2023-05-22 RX ORDER — CEFAZOLIN SODIUM 1 G
2000 VIAL (EA) INJECTION ONCE
Refills: 0 | Status: COMPLETED | OUTPATIENT
Start: 2023-05-22 | End: 2023-05-22

## 2023-05-22 RX ORDER — CELECOXIB 200 MG/1
200 CAPSULE ORAL EVERY 12 HOURS
Refills: 0 | Status: DISCONTINUED | OUTPATIENT
Start: 2023-05-24 | End: 2023-05-25

## 2023-05-22 RX ORDER — TRAMADOL HYDROCHLORIDE 50 MG/1
50 TABLET ORAL EVERY 6 HOURS
Refills: 0 | Status: DISCONTINUED | OUTPATIENT
Start: 2023-05-23 | End: 2023-05-25

## 2023-05-22 RX ORDER — LISINOPRIL 2.5 MG/1
20 TABLET ORAL
Refills: 0 | Status: DISCONTINUED | OUTPATIENT
Start: 2023-05-23 | End: 2023-05-25

## 2023-05-22 RX ORDER — HYDROCHLOROTHIAZIDE 25 MG
1 TABLET ORAL
Refills: 0 | DISCHARGE

## 2023-05-22 RX ORDER — ONDANSETRON 8 MG/1
4 TABLET, FILM COATED ORAL EVERY 6 HOURS
Refills: 0 | Status: DISCONTINUED | OUTPATIENT
Start: 2023-05-23 | End: 2023-05-25

## 2023-05-22 RX ORDER — HYDROMORPHONE HYDROCHLORIDE 2 MG/ML
0.5 INJECTION INTRAMUSCULAR; INTRAVENOUS; SUBCUTANEOUS
Refills: 0 | Status: DISCONTINUED | OUTPATIENT
Start: 2023-05-22 | End: 2023-05-22

## 2023-05-22 RX ORDER — SENNA PLUS 8.6 MG/1
2 TABLET ORAL AT BEDTIME
Refills: 0 | Status: DISCONTINUED | OUTPATIENT
Start: 2023-05-23 | End: 2023-05-25

## 2023-05-22 RX ORDER — MAGNESIUM HYDROXIDE 400 MG/1
30 TABLET, CHEWABLE ORAL DAILY
Refills: 0 | Status: DISCONTINUED | OUTPATIENT
Start: 2023-05-23 | End: 2023-05-25

## 2023-05-22 RX ORDER — ONDANSETRON 8 MG/1
4 TABLET, FILM COATED ORAL ONCE
Refills: 0 | Status: DISCONTINUED | OUTPATIENT
Start: 2023-05-22 | End: 2023-05-22

## 2023-05-22 RX ORDER — LEVOTHYROXINE SODIUM 125 MCG
1 TABLET ORAL
Refills: 0 | DISCHARGE

## 2023-05-22 RX ORDER — CALCIUM CARBONATE 500(1250)
1 TABLET ORAL
Refills: 0 | DISCHARGE

## 2023-05-22 RX ADMIN — PANTOPRAZOLE SODIUM 40 MILLIGRAM(S): 20 TABLET, DELAYED RELEASE ORAL at 14:06

## 2023-05-22 RX ADMIN — TRAMADOL HYDROCHLORIDE 50 MILLIGRAM(S): 50 TABLET ORAL at 14:06

## 2023-05-22 RX ADMIN — OXYCODONE HYDROCHLORIDE 5 MILLIGRAM(S): 5 TABLET ORAL at 23:11

## 2023-05-22 RX ADMIN — OXYCODONE HYDROCHLORIDE 5 MILLIGRAM(S): 5 TABLET ORAL at 22:11

## 2023-05-22 NOTE — PRE-ANESTHESIA EVALUATION ADULT - NSANTHPMHFT_GEN_ALL_CORE
77 year old female with past medical history of HTN, HLD, hypothyroidism, Right breast cancer s/p radation tx and lumpectomy (2017, receives annually mammos). History of left knee arthroplasty 11/2021 at [facility] with patellar dislocation Feb 2022 s/p surgery for realignment presenting for scheduled Left total knee arthroplasty revision wÁngel robot assist w. MICHELLE

## 2023-05-22 NOTE — PATIENT PROFILE ADULT - FALL HARM RISK - RISK INTERVENTIONS
Assistance OOB with selected safe patient handling equipment/Assistance with ambulation/Communicate Fall Risk and Risk Factors to all staff, patient, and family/Discuss with provider need for PT consult/Monitor gait and stability/Reinforce activity limits and safety measures with patient and family/Visual Cue: Yellow wristband/Bed in lowest position, wheels locked, appropriate side rails in place/Call bell, personal items and telephone in reach/Instruct patient to call for assistance before getting out of bed or chair/Non-slip footwear when patient is out of bed/[place] to call system/Physically safe environment - no spills, clutter or unnecessary equipment/Purposeful Proactive Rounding/Room/bathroom lighting operational, light cord in reach

## 2023-05-22 NOTE — DISCHARGE NOTE PROVIDER - NSDCHHNEEDSERVICEOTHER_GEN_ALL_CORE_FT
Bandage Instructions:  Prevena wound vac placed to incision. Prevena wound vac to be removed on POD #6(5/28/23) by home care nursing, and replaced with aquacel dressing (supplied to the patient at discharge).   Aquacel dressing to remain intact, and will be removed by Dr. Hernandez or his team at the 1st post operative appointment.

## 2023-05-22 NOTE — DISCHARGE NOTE PROVIDER - NSDCFUADDINST_GEN_ALL_CORE_FT
Dressing care:   -Keep dressing clean, dry, and intact. Do not remove bandage until date written on bandage.   Continue to ambulate as tolerated to the left leg.     Bandage Instructions:  Prevena wound vac placed to incision. Prevena wound vac to be removed on POD #6 (5/28/23) by home care nursing, and replaced with Aquacel dressing (supplied to the patient at discharge).   Aquacel dressing to remain intact, and will be removed by Dr. Hernandez or his team at the 1st post operative appointment.     Pain medications:   Standing:         -Acetaminophen 325mg - 3 tabs every 8 hours        -Naproxen 500mg - 1 tab every 12 hours  As needed:        -Tramadol 50mg - 1 tab every 8-12 hours - Take only if needed for MODERATE pain       -oxycodone 5mg - 1 tab every 4-6 hours - Take only if needed for SEVERE or BREAKTHROUGH pain    Other Medications: (Standing)  -Aspirin (Enteric Coated) 81mg every 12 hours - to prevent blood clots (for 4 weeks post operatively.)  -Protonix 40mg - 1 tab every 24 hours - to prevent stomach irritation/ulcers  -Senna 8.6mg - 2 pills every 24 hours - stool softener  -colace 100mg - 1 pill every 8 hours - stool softener.     Recommended to follow up with your primary care provider within 1-2 months of hospital discharge to discuss your recent surgery and any change to your medications.  Dressing care:   -Keep dressing clean, dry, and intact. Do not remove bandage   Continue to ambulate as tolerated to the left leg. You are given Aquacel dressing to bring home with you, and give to Nurse who will removed Vacume dressing post operative day #6, and apply AquaCel dressing.    Bandage Instructions:  Prevena wound vac placed to incision. Prevena wound vac to be removed on POD #6 (5/28/23) by home care nursing, and replaced with Aquacel dressing (supplied to the patient at discharge).   Aquacel dressing to remain intact, and will be removed by Dr. Hernandez or his team at the 1st post operative appointment.     Pain medications:   Standing:         -Acetaminophen 325mg - 3 tabs every 8 hours        -Naproxen 500mg - 1 tab every 12 hours  As needed:        -Tramadol 50mg - 1 tab every 8-12 hours - Take only if needed for MODERATE pain       -oxycodone 5mg - 1 tab every 4-6 hours - Take only if needed for SEVERE or BREAKTHROUGH pain    Other Medications: (Standing)  -Aspirin (Enteric Coated) 81mg every 12 hours - to prevent blood clots (for 4 weeks post operatively.)  -Protonix 40mg - 1 tab every 24 hours - to prevent stomach irritation/ulcers  -Senna 8.6mg - 2 pills every 24 hours - stool softener  -colace 100mg - 1 pill every 8 hours - stool softener.     Recommended to follow up with your primary care provider within 1-2 months of hospital discharge to discuss your recent surgery and any change to your medications.  Dressing care:   -Keep dressing clean, dry, and intact. Do not remove bandage   Continue to ambulate as tolerated to the left leg. You are given Aquacel dressing to bring home with you, and give to Nurse who will removed Prevena dressing post operative day #6, and apply AquaCel dressing.    Bandage Instructions:  Prevena wound vac placed to incision. Prevena wound vac to be removed on POD #6 (5/28/23) by home care nursing, and replaced with Aquacel dressing (supplied to the patient at discharge).   Aquacel dressing to remain intact, and will be removed by Dr. Hernandez or his team at the 1st post operative appointment.     Pain medications:   Standing:         -Acetaminophen 325mg - 3 tabs every 8 hours        -Naproxen 500mg - 1 tab every 12 hours  As needed:        -Tramadol 50mg - 1 tab every 8-12 hours - Take only if needed for MODERATE pain       -oxycodone 5mg - 1 tab every 4-6 hours - Take only if needed for SEVERE or BREAKTHROUGH pain    Other Medications: (Standing)  -Aspirin (Enteric Coated) 81mg every 12 hours - to prevent blood clots (for 4 weeks post operatively.)  -Protonix 40mg - 1 tab every 24 hours - to prevent stomach irritation/ulcers  -Senna 8.6mg - 2 pills every 24 hours - stool softener  -colace 100mg - 1 pill every 8 hours - stool softener.     Recommended to follow up with your primary care provider within 1-2 months of hospital discharge to discuss your recent surgery and any change to your medications.

## 2023-05-22 NOTE — DISCHARGE NOTE PROVIDER - NSDCHHNEEDSERVICE_GEN_ALL_CORE
Rehabilitation services/Wound care and assessment Rehabilitation services/Wound care and assessment/Other, specify...

## 2023-05-22 NOTE — DISCHARGE NOTE PROVIDER - NSDCMRMEDTOKEN_GEN_ALL_CORE_FT
hydroCHLOROthiazide 25 mg oral tablet: 1 orally once a day  levothyroxine 75 mcg (0.075 mg) oral capsule: 1 orally once a day  lisinopril 20 mg oral tablet: 1 orally 2 times a day  Oyster Lio 500 oral tablet: 1 orally 2 times a day  simvastatin 20 mg oral tablet: 1 orally once a day (at bedtime)  trospium 20 mg oral tablet: 1 orally 2 times a day   acetaminophen 325 mg oral tablet: 3 tab(s) orally every 8 hours as needed for as needed for fever, H/A, mild pain  apixaban 2.5 mg oral tablet: 1 tab(s) orally every 12 hours MDD: 2  [place] Brace: s/p L revision total knee arthroplasty, set at 0-90 degrees  celecoxib 200 mg oral capsule: 1 cap(s) orally every 12 hours as needed for moderate pain MDD: 2  hydroCHLOROthiazide 25 mg oral tablet: 1 orally once a day  levothyroxine 75 mcg (0.075 mg) oral capsule: 1 orally once a day  lisinopril 20 mg oral tablet: 1 orally 2 times a day  oxyCODONE 5 mg oral tablet: 1 tab(s) orally every 4 hours as needed for severe pain MDD: 6  Oyster Lio 500 oral tablet: 1 orally 2 times a day  simvastatin 20 mg oral tablet: 1 orally once a day (at bedtime)  trospium 20 mg oral tablet: 1 orally 2 times a day

## 2023-05-22 NOTE — DISCHARGE NOTE PROVIDER - NSDCCPCAREPLAN_GEN_ALL_CORE_FT
PRINCIPAL DISCHARGE DIAGNOSIS  Diagnosis: S/P revision of total knee, left  Assessment and Plan of Treatment:

## 2023-05-22 NOTE — DISCHARGE NOTE PROVIDER - HOSPITAL COURSE
History of Present Illness:   This is a 77 year old female with past medical history of HTN, HLD, hypothyroidism, Right breast cancer s/p radation tx and lumpectomy (2017, receives annually mammos). History of left knee arthroplasty 11/2021 at [facility] with patellar dislocation Feb 2022 s/p surgery for realignment. Pt still reports 5 out of 10 chrnic knee pain despite PT and pain medications. Received steroidal injection with surgeon 4/18/23. Today presenting for scheduled Left total knee arthroplasty revision w. robot assist whitney MARTINEZ on 5/22/23 with Dr. Hernandez    CT MICHELLE performed on 4/27 @ [address]    Goals of Care: To walk better without a cane    PAST MEDICAL HISTORY:  Breast cancer, right   HLD (hyperlipidemia)   HTN (hypertension)   Hypothyroidism   OA (osteoarthritis).     PAST SURGICAL HISTORY:  H/O bilateral hip replacements   H/O total knee replacement, left   History of appendectomy   History of lumpectomy   History of tonsillectomy   S/P hysterectomy.    Hospital Course:  After admission on 5/22 and receiving pre-operative parenteral prophylactic antibiotics, the patient underwent an uncomplicated revision of Left total knee arthroplasty with Dr. Hernandez. Patient tolerated the procedure well and was transferred to the recovery room in stable condition, with a stable neuro/vascular exam of the operated extremity.    Patient was placed on Eliquis 2.5 mg for DVT ppx, and was placed on Protonix 40 mg for GI protection.   Patient was made weight bearing as tolerated with the operative leg in a knee immobilizer.     Typical Physical & occupational therapy modalities post surgery were performed by physical and occupational therapies, including ambulation training, range of motion, ADL's, abd transfers.  After progression of mobility guided by the PT/ OT staff,  the patient was felt to benefit from further rehabilitative care for restoration to level of function. This was felt to best be accomplished at [facility].  Discharge and Orthopedic Care instructions were delineated in the Discharge Plan and reviewed with the patient. All medications were delineated in the medication reconciliation tool and key points were reviewed with the patient. They were deemed stable from an Orthopedic & medical standpoint for discharge *** History of Present Illness:   This is a 77 year old female with past medical history of HTN, HLD, hypothyroidism, Right breast cancer s/p radation tx and lumpectomy (2017, receives annually mammos). History of left knee arthroplasty 11/2021 at [facility] with patellar dislocation Feb 2022 s/p surgery for realignment. Pt still reports 5 out of 10 chrnic knee pain despite PT and pain medications. Received steroidal injection with surgeon 4/18/23. Today presenting for scheduled Left total knee arthroplasty revision w. robot assist whitney MARTINEZ on 5/22/23 with Dr. Hernandez    CT MICHELLE performed on 4/27 @ [address]    Goals of Care: To walk better without a cane    PAST MEDICAL HISTORY:  Breast cancer, right   HLD (hyperlipidemia)   HTN (hypertension)   Hypothyroidism   OA (osteoarthritis).     PAST SURGICAL HISTORY:  H/O bilateral hip replacements   H/O total knee replacement, left   History of appendectomy   History of lumpectomy   History of tonsillectomy   S/P hysterectomy.    Hospital Course:  After admission on 5/22 and receiving pre-operative parenteral prophylactic antibiotics, the patient underwent an uncomplicated revision of Left total knee arthroplasty with Dr. Hernandez. Patient tolerated the procedure well and was transferred to the recovery room in stable condition, with a stable neuro/vascular exam of the operated extremity.    Patient was placed on Eliquis 2.5 mg for DVT ppx, and was placed on Protonix 40 mg for GI protection.   Patient was made weight bearing as tolerated with the operative leg in a Volcano brace unlocked 0-90'     Typical Physical & occupational therapy modalities post surgery were performed by physical and occupational therapies, including ambulation training, range of motion, ADL's, abd transfers.  After progression of mobility guided by the PT/ OT staff,  the patient was felt to benefit from further rehabilitative care for restoration to level of function. This was felt to best be accomplished at [facility].  Discharge and Orthopedic Care instructions were delineated in the Discharge Plan and reviewed with the patient. All medications were delineated in the medication reconciliation tool and key points were reviewed with the patient. They were deemed stable from an Orthopedic & medical standpoint for discharge home 5/25/2023. History of Present Illness:   This is a 77 year old female with past medical history of HTN, HLD, hypothyroidism, Right breast cancer s/p radation tx and lumpectomy (2017, receives annually mammos). History of left knee arthroplasty 11/2021 at [facility] with patellar dislocation Feb 2022 s/p surgery for realignment. Pt still reports 5 out of 10 chrnic knee pain despite PT and pain medications. Received steroidal injection with surgeon 4/18/23. Today presenting for scheduled Left total knee arthroplasty revision w. robot assist whitney MARTINEZ on 5/22/23 with Dr. Hernandez    CT MICHELLE performed on 4/27 @ [address]    Goals of Care: To walk better without a cane    PAST MEDICAL HISTORY:  Breast cancer, right   HLD (hyperlipidemia)   HTN (hypertension)   Hypothyroidism   OA (osteoarthritis).     PAST SURGICAL HISTORY:  H/O bilateral hip replacements   H/O total knee replacement, left   History of appendectomy   History of lumpectomy   History of tonsillectomy   S/P hysterectomy.    Hospital Course:  After admission on 5/22 and receiving pre-operative parenteral prophylactic antibiotics, the patient underwent an uncomplicated revision of Left total knee arthroplasty with Dr. Hernandez. Patient tolerated the procedure well and was transferred to the recovery room in stable condition, with a stable neuro/vascular exam of the operated extremity.    Patient was placed on Eliquis 2.5 mg for DVT ppx, and was placed on Protonix 40 mg for GI protection.   Patient was made weight bearing as tolerated with the operative leg in a Kitzmiller brace unlocked 0-90'     Typical Physical & occupational therapy modalities post surgery were performed by physical and occupational therapies, including ambulation training, range of motion, ADL's, abd transfers.  After progression of mobility guided by the PT/ OT staff,  the patient was felt to benefit from further rehabilitative care for restoration to level of function. This was felt to best be accomplished at home with home PT.   Discharge and Orthopedic Care instructions were delineated in the Discharge Plan and reviewed with the patient. All medications were delineated in the medication reconciliation tool and key points were reviewed with the patient. They were deemed stable from an Orthopedic & medical standpoint for discharge home 5/25/2023.

## 2023-05-22 NOTE — DISCHARGE NOTE PROVIDER - NSDCFUSCHEDAPPT_GEN_ALL_CORE_FT
[facility]  ORTHOSURG [address]  Scheduled Appointment: 06/08/2023

## 2023-05-22 NOTE — DISCHARGE NOTE PROVIDER - CARE PROVIDER_API CALL
Paul Hernandez)  Orthopaedic Surgery  [address], [address]  [place] [address]  Phone: [phone]  Fax: [phone]  Established Patient  Follow Up Time: 2 weeks

## 2023-05-22 NOTE — DISCHARGE NOTE PROVIDER - NSDCFUADDAPPT_GEN_ALL_CORE_FT
Please follow up with Dr. Hernandez at your pre-scheduled post-operative follow up appointment. (Call to confirm)

## 2023-05-22 NOTE — PRE-OP CHECKLIST - PATIENT'S PERSONAL PROPERTY GIVEN TO
Coorespondence received from [employer] [facility] (P) [phone] (F) [phone] regarding w/c, Md needed to complete/sign.  Paperwork completed/signed and faxed.  Lakeshia confirmed receipt of fax and will reach out to pt/family to coordinate delivery time/date.  No other needs verbalized at this time.        ELISE BALDWIN   on unit

## 2023-05-23 LAB
ANION GAP SERPL CALC-SCNC: 12 MMOL/L — SIGNIFICANT CHANGE UP (ref 5–17)
BUN SERPL-MCNC: 24 MG/DL — HIGH (ref 7–23)
CALCIUM SERPL-MCNC: 8.8 MG/DL — SIGNIFICANT CHANGE UP (ref 8.4–10.5)
CHLORIDE SERPL-SCNC: 103 MMOL/L — SIGNIFICANT CHANGE UP (ref 96–108)
CO2 SERPL-SCNC: 22 MMOL/L — SIGNIFICANT CHANGE UP (ref 22–31)
CREAT SERPL-MCNC: 0.8 MG/DL — SIGNIFICANT CHANGE UP (ref 0.5–1.3)
EGFR: 76 ML/MIN/1.73M2 — SIGNIFICANT CHANGE UP
GLUCOSE SERPL-MCNC: 159 MG/DL — HIGH (ref 70–99)
GRAM STN FLD: SIGNIFICANT CHANGE UP
GRAM STN FLD: SIGNIFICANT CHANGE UP
HCT VFR BLD CALC: 36.2 % — SIGNIFICANT CHANGE UP (ref 34.5–45)
HGB BLD-MCNC: 11.6 G/DL — SIGNIFICANT CHANGE UP (ref 11.5–15.5)
MCHC RBC-ENTMCNC: 28.2 PG — SIGNIFICANT CHANGE UP (ref 27–34)
MCHC RBC-ENTMCNC: 32 GM/DL — SIGNIFICANT CHANGE UP (ref 32–36)
MCV RBC AUTO: 87.9 FL — SIGNIFICANT CHANGE UP (ref 80–100)
NIGHT BLUE STAIN TISS: SIGNIFICANT CHANGE UP
NRBC # BLD: 0 /100 WBCS — SIGNIFICANT CHANGE UP (ref 0–0)
PLATELET # BLD AUTO: 173 K/UL — SIGNIFICANT CHANGE UP (ref 150–400)
POTASSIUM SERPL-MCNC: 4.3 MMOL/L — SIGNIFICANT CHANGE UP (ref 3.5–5.3)
POTASSIUM SERPL-SCNC: 4.3 MMOL/L — SIGNIFICANT CHANGE UP (ref 3.5–5.3)
RBC # BLD: 4.12 M/UL — SIGNIFICANT CHANGE UP (ref 3.8–5.2)
RBC # FLD: 14.5 % — SIGNIFICANT CHANGE UP (ref 10.3–14.5)
SODIUM SERPL-SCNC: 137 MMOL/L — SIGNIFICANT CHANGE UP (ref 135–145)
SPECIMEN SOURCE: SIGNIFICANT CHANGE UP
WBC # BLD: 14.72 K/UL — HIGH (ref 3.8–10.5)
WBC # FLD AUTO: 14.72 K/UL — HIGH (ref 3.8–10.5)

## 2023-05-23 RX ORDER — SODIUM CHLORIDE 9 MG/ML
500 INJECTION INTRAMUSCULAR; INTRAVENOUS; SUBCUTANEOUS ONCE
Refills: 0 | Status: DISCONTINUED | OUTPATIENT
Start: 2023-05-23 | End: 2023-05-23

## 2023-05-23 RX ORDER — SODIUM CHLORIDE 9 MG/ML
500 INJECTION INTRAMUSCULAR; INTRAVENOUS; SUBCUTANEOUS ONCE
Refills: 0 | Status: DISCONTINUED | OUTPATIENT
Start: 2023-05-23 | End: 2023-05-25

## 2023-05-23 RX ORDER — CEFAZOLIN SODIUM 1 G
2000 VIAL (EA) INJECTION EVERY 8 HOURS
Refills: 0 | Status: COMPLETED | OUTPATIENT
Start: 2023-05-23 | End: 2023-05-24

## 2023-05-23 RX ADMIN — OXYCODONE HYDROCHLORIDE 5 MILLIGRAM(S): 5 TABLET ORAL at 23:40

## 2023-05-23 RX ADMIN — OXYCODONE HYDROCHLORIDE 5 MILLIGRAM(S): 5 TABLET ORAL at 06:21

## 2023-05-23 RX ADMIN — Medication 1000 MILLIGRAM(S): at 03:17

## 2023-05-23 RX ADMIN — OXYCODONE HYDROCHLORIDE 5 MILLIGRAM(S): 5 TABLET ORAL at 16:01

## 2023-05-23 RX ADMIN — Medication 400 MILLIGRAM(S): at 09:39

## 2023-05-23 RX ADMIN — APIXABAN 2.5 MILLIGRAM(S): 2.5 TABLET, FILM COATED ORAL at 05:21

## 2023-05-23 RX ADMIN — Medication 100 MILLIGRAM(S): at 22:16

## 2023-05-23 RX ADMIN — Medication 975 MILLIGRAM(S): at 22:16

## 2023-05-23 RX ADMIN — OXYCODONE HYDROCHLORIDE 5 MILLIGRAM(S): 5 TABLET ORAL at 23:10

## 2023-05-23 RX ADMIN — ATORVASTATIN CALCIUM 40 MILLIGRAM(S): 80 TABLET, FILM COATED ORAL at 22:16

## 2023-05-23 RX ADMIN — POLYETHYLENE GLYCOL 3350 17 GRAM(S): 17 POWDER, FOR SOLUTION ORAL at 22:16

## 2023-05-23 RX ADMIN — OXYCODONE HYDROCHLORIDE 5 MILLIGRAM(S): 5 TABLET ORAL at 16:43

## 2023-05-23 RX ADMIN — Medication 400 MILLIGRAM(S): at 02:17

## 2023-05-23 RX ADMIN — Medication 400 MILLIGRAM(S): at 18:07

## 2023-05-23 RX ADMIN — SENNA PLUS 2 TABLET(S): 8.6 TABLET ORAL at 22:15

## 2023-05-23 RX ADMIN — OXYCODONE HYDROCHLORIDE 5 MILLIGRAM(S): 5 TABLET ORAL at 05:21

## 2023-05-23 RX ADMIN — APIXABAN 2.5 MILLIGRAM(S): 2.5 TABLET, FILM COATED ORAL at 17:06

## 2023-05-23 RX ADMIN — Medication 975 MILLIGRAM(S): at 22:46

## 2023-05-23 NOTE — OCCUPATIONAL THERAPY INITIAL EVALUATION ADULT - IMPAIRED TRANSFERS: SIT/STAND, REHAB EVAL
Radiation Oncology      Valentina Cagle. Vishnu Vail MD [address] [place] [address]      Referring Physician: Dr. Carlitos Caraballo MD   Primary Oncologist: -      Diagnosis: cT2 LLE BCC, medically unresectable   -NCCN high risk    Service:  Radiation Oncology consultation performed on 2*22/21        HPI:        Wayne Cancino is a pleasantly demented 81year old with a history of skin cancer and other medical comorbidity s/p treatment to the more superior BCC at [facility] under our care. She now has a LLE skin lesions and desires treatment for this. The patient presents today to discuss fractionated external beam radiation therapy as a component of multidisciplinary, definitve management. We reviewed the available medical records including the complete medical history of this pt today prior to consultation. Epic -CE and available scanned documents per the Epic Media tab were reviewed PRN. A complete ROS was also performed today and is noted below. During consultation today I personally discussed the pts workup to date; including but not limited to applicable imaging studies, Pathology reports, and interventions. The NCCN guidelines, as pertaining to the above diagnosis were also recapped for the pt today in brief. Today, Dm Ferrer  notes Sx that include bleeding soreness. KPS 80-90.          -----  SARS-CoV-2:    Pt asymptomatic and afebrile. CDC NOV 2020: http://www.stephens.com/. html:    The likelihood of recovering replication-competent virus also declines after onset of symptoms. For patients with mild to moderate YJTVX-12, replication-competent virus has not been recovered after 10 days following symptom onset (CDC, unpublished data, 2020; Nisha Mane et al., 2020; Cherise et al., 2020; Frida et al., 2020;  Jose Perez et al., 2020; personal communication with Ludy Mejia, 2020; Arnaldo eyes every 4 hours as needed      Sodium Phosphates (FLEET) 7-19 GM/118ML Place 1 enema rectally See Admin Instructions Every fourth day if no bm      mirtazapine (REMERON) 15 MG tablet Take 15 mg by mouth nightly      Bisacodyl (DULCOLAX RE) Place rectally      magnesium hydroxide (MILK OF MAGNESIA) 400 MG/5ML suspension Take by mouth daily as needed for Constipation      memantine (NAMENDA XR) 28 MG CP24 capsule Take 28 mg by mouth daily      acetaminophen (TYLENOL) 325 MG tablet Take 650 mg by mouth every 4 hours as needed for Pain      pravastatin (PRAVACHOL) 40 MG tablet Take 40 mg by mouth daily      amLODIPine (NORVASC) 10 MG tablet Take 10 mg by mouth daily       No current facility-administered medications for this encounter.         No Known Allergies      Social History     Socioeconomic History    Marital status: Single     Spouse name: None    Number of children: None    Years of education: None    Highest education level: None   Occupational History    None   Social Needs    Financial resource strain: None    Food insecurity     Worry: None     Inability: None    Transportation needs     Medical: None     Non-medical: None   Tobacco Use    Smoking status: Never Smoker    Smokeless tobacco: Current User     Types: Chew    Tobacco comment: 60 years with chewing tobacco    Substance and Sexual Activity    Alcohol use: No     Alcohol/week: 0.0 standard drinks    Drug use: No    Sexual activity: None   Lifestyle    Physical activity     Days per week: None     Minutes per session: None    Stress: None   Relationships    Social connections     Talks on phone: None     Gets together: None     Attends Mandaeism service: None     Active member of club or organization: None     Attends meetings of clubs or organizations: None     Relationship status: None    Intimate partner violence     Fear of current or ex partner: None     Emotionally abused: None     Physically abused: None Forced sexual activity: None   Other Topics Concern    None   Social History Narrative    None         Review of Systems - History obtained from chart review and the patient  General ROS: positive for  - fatigue  Psychological ROS: negative  Ophthalmic ROS: negative  ENT ROS: negative  Allergy and Immunology ROS: negative  Hematological and Lymphatic ROS: negative  Endocrine ROS: negative  Respiratory ROS: no cough, shortness of breath, or wheezing  Cardiovascular ROS: no chest pain or dyspnea on exertion  Gastrointestinal ROS: no abdominal pain, change in bowel habits, or black or bloody stools  Genito-Urinary ROS: no dysuria, trouble voiding, or hematuria  Musculoskeletal ROS: negative  Neurological ROS: no TIA or stroke symptoms  Dermatological ROS: positive for - skin lesion changes        Physical Exam  HENT:      Head: Normocephalic. Right Ear: External ear normal.      Left Ear: External ear normal.      Nose: Nose normal.      Mouth/Throat:      Mouth: Mucous membranes are moist.   Eyes:      Pupils: Pupils are equal, round, and reactive to light. Neck:      Musculoskeletal: Normal range of motion. Cardiovascular:      Rate and Rhythm: Normal rate. Pulses: Normal pulses. Pulmonary:      Effort: Pulmonary effort is normal.   Abdominal:      General: Abdomen is flat. Musculoskeletal: Normal range of motion. Skin:     General: Skin is warm. Findings: Lesion present. Neurological:      General: No focal deficit present. Mental Status: She is alert and oriented to person, place, and time. Psychiatric:         Thought Content:  Thought content normal.         Judgment: Judgment normal.             Imaging reviewed:    TRAVIS      Radiation Safety and Treatment Support:  -previous Radiation history: yes - leg (skin CA)  -history of connective tissue disease: No  -history of autoimmune disease: No  -pregnant: not applicable  -fertility conservation and /or contraception discussed: not applicable  -nutrition consult prior to [address]: Yes  -PEG: No  -Dental evaluation prior to treatment:No  -Social Work requested: Yes  -Oncology Nurse Navigator requested: Yes  -pre + post treatment PT / Rehab / PM+R evaluation considered: Yes  -ICD: No   -ICD brand: -  -[facility] patient navigator: Beau Dubois  -Nurse Practitioners for Radiation Oncology:    ---Shruthi Olivera, MSN, RN, FNP-C   ---Soumya Whittaker, MSN, RN, FNP-BC        Assessment and Plan: Gerald Saenz is a pleasant and cooperative 81year old with a recent diagnosis of AJCC stage group II skin cancer - NCCN high risk. We recommend definative intent fractionated external beam radiation therapy. NCCN reviewed. Outcomes per Mitlon et al [facility]). The risks, benefits, alternatives, process and logistics of external beam radiation were reviewed today. We answered all of the patient's questions to the best of our ability. Gerald Saenz verbalized understanding and seemed satisfied. Radiation planning will commence within 7 days; the next step in management being the simulation scan, with external beam radiation to commence in a timely fashion thereafter. It was a pleasure meeting Gerald Saenz today and we appreciate the referral and opportunity to be involved in her care. We had an extensive discussion today regarding the course to date (including a focused review of theapplicable radiographic and laboratory information), multidisciplinary approach to cancer care, and indications for external beam radiation therapy as a component therein. A literature review and multidisciplinary discussion was performed after seeing this patient due to the complexity of the medical decision making in this case. I personally spent greater than 70 minutes on this case and with this patient.  I performed the complete history and physical as above at today's visit, at least 45 minutes was in direct discussion and  regarding disease management.           -sim  -physics consult if field overlap.  -pt PA will be contacted. Zoila Jordan. Kathleen Estrella MD [place] [address] Oncology  Cell: [phone]    [facility]:  [facility] [address]: [phone]  [address]:  [phone]   FAX:    [phone]  [address] [place] [address] Road:  [phone]   FAX:  [phone]        NOTE: This report was transcribed using voice recognition software. Every effort was made to ensure accuracy; however, inadvertent computerized transcription errors may be present. impaired balance/decreased ROM/decreased strength

## 2023-05-23 NOTE — PHYSICAL THERAPY INITIAL EVALUATION ADULT - PLANNED THERAPY INTERVENTIONS, PT EVAL
Goal: Patient will negotiate up and down 5 steps with unilateral rail independently, within 4 weeks./balance training/bed mobility training/gait training/transfer training

## 2023-05-23 NOTE — PROVIDER CONTACT NOTE (OTHER) - ACTION/TREATMENT ORDERED:
Cory SOTO made aware. As per PA, assist pt OOB to commode w/ orthostatics done. Void on commode will distinguish further interventions. Cory SOTO made aware. As per PA, straight cath patient due to retention. Will continue to monitor pt, now DTV @1500.

## 2023-05-23 NOTE — OCCUPATIONAL THERAPY INITIAL EVALUATION ADULT - ADL RETRAINING, OT EVAL
GOAL: Pt will require min A with all bathing tasks within 4 weeks.  GOAL: Pt will require min A in all toileting tasks within 4 weeks.

## 2023-05-23 NOTE — PHYSICAL THERAPY INITIAL EVALUATION ADULT - ADDITIONAL COMMENTS
Pt reports she lives with daughter in private home with 5 stairs to entrance with bilateral HR, then 5 steps with bilateral HR to basement where pt's living space, bedroom, and bathroom are. Pt with walker in shower. Pt reports ambulating with RW prior to admission and daughter assist with ADL's.

## 2023-05-23 NOTE — OCCUPATIONAL THERAPY INITIAL EVALUATION ADULT - LIGHT TOUCH SENSATION, RUE, REHAB EVAL
[de-identified] : I discussed nonoperative and operative treatment options for their left hiparthritis. We discussed nonoperative treatments options including activity modification, therapy, gait aides, nonsteroidal anti-inflammatory medications, and injections. The patient would like to continue with nonoperative treatment of their arthritis and would like to proceed with activity modification and weight loss, NSAIDS, steroid injection.\par \par She has just quit smoking recently and is currently taking Nicorette gum and patches.  Explained to her that she cannot smoke within 2 months of a joint replacement or for the first 3 months afterwards.  She is committed to quitting smoking long-term.  \par \par She has a wedding at the end of July and cannot take time for a hip replacement at this time.  She would like to try an injection in the left hip.  I explained to her that an intra-articular steroid injection may help with pain but would not be a long-term solution as they has hip arthritis.  We also discussed that there is a very rare possibility of rapid progressive osteoarthritis after the hip injection which may worsen their symptoms.  I also explained that due to the increased risk of infection, we do not perform hip replacements within 3 months of an intra-articular injection to the joint.  Patient understands the risks and is willing to proceed with the injection.  Our office will work to schedule the injection at their earliest convenience and they will follow-up in about 2 months.\par \par I discussed with them that I often prescribe an anti-inflammatory that should be taken once a day with meals to decrease pain and expedite symptom relief. They should not take this while also taking Aleve (Naprosyn), Motrin/ Advil (Ibuprofen), Toradol (ketoralac). They must stop taking it if they develops stomach pain, increased bleeding or bruising and they should follow-up with their primary care doctor for routine blood work including kidney function to monitor its effect. While it Is not a habit-forming substance, it should only  be taken as needed and to discontinue use once symptoms have resolved.  She was prescribed meloxicam by her primary care doctor already but has not taken as she want to receive consultation from me first.  I suggest taking a daily as needed for pain.\par \par We discussed that when nonoperative treatment is no longer successful and their pain is severe enough that it is affecting their ability to perform activities of daily living, a joint replacement may help them as long as they have quit smoking.\par \par My cumulative time spent on this patient’s visit included: Preparation for the visit, review of the medical records, review of pertinent diagnostic studies, examination and counseling of the patient on the above diagnosis, treatment plan and prognosis, orders of diagnostic tests, medications and/or appropriate procedures and documentation in the medical records of today’s visit.\par \par 
within normal limits

## 2023-05-23 NOTE — PHYSICAL THERAPY INITIAL EVALUATION ADULT - MANUAL MUSCLE TESTING RESULTS, REHAB EVAL
BUE 5/5, RLE grossly at least 3/5, LLE grossly 3/5 except L knee not assessed/grossly assessed due to

## 2023-05-23 NOTE — OCCUPATIONAL THERAPY INITIAL EVALUATION ADULT - DIAGNOSIS, OT EVAL
pt presents with decreased strength, endurance,  and balance limiting their ability to engage in ADLs and functional tasks.

## 2023-05-23 NOTE — PHYSICAL THERAPY INITIAL EVALUATION ADULT - WEIGHT-BEARING RESTRICTIONS, REHAB EVAL
[Respiratory Effort] : normal respiratory effort [Normal Rate and Rhythm] : normal rate and rhythm [No Rash or Lesion] : No rash or lesion [de-identified] : NAD, well-appearing [de-identified] : Anicteric [de-identified] : soft, nondistended, nontender; there is very small umbilical hernia and a moderate ~ 4-5cm diastasis recti, particularly supraumbilically.  weight-bearing as tolerated

## 2023-05-23 NOTE — PHYSICAL THERAPY INITIAL EVALUATION ADULT - PERTINENT HX OF CURRENT PROBLEM, REHAB EVAL
77 year old female with past medical history of HTN, HLD, hypothyroidism, Right breast cancer s/p radation tx and lumpectomy (2017, receives annually mammograms), hx of left knee arthroplasty 11/2021 at [facility] with patellar dislocation Feb 2022 s/p sx for realignment. Pt still reports 5 out of 10 chronic knee pain despite PT and pain medications. Received steroidal injection with surgeon 4/18/23. Now presents for scheduled Left total knee arthroplasty revision w/ robot assist whitney MARTINEZ on 5/22/23 with Dr. Hernandez.

## 2023-05-23 NOTE — PROVIDER CONTACT NOTE (OTHER) - ASSESSMENT
Pt was DTV @0330. Bladder scanned @0400 and ultrasound showed 950mL. Pt c/o feeling pressure on bladder. When trying to urinate pt states "nothing comes out." Pt was DTV @0330. Bladder scanned @0400 and ultrasound showed 950mL. Cory SOTO made aware. As per PA, assist pt OOB to commode w/ orthostatics done. Pt voided 200mL on commode. Post void bladder scan showed 689mL.

## 2023-05-23 NOTE — PHYSICAL THERAPY INITIAL EVALUATION ADULT - ACTIVE RANGE OF MOTION EXAMINATION, REHAB EVAL
L ankle WFL, L knee not assist due to KI/bilateral upper extremity Active ROM was WFL (within functional limits)/Right LE Active ROM was WFL (within functional limits)

## 2023-05-23 NOTE — OCCUPATIONAL THERAPY INITIAL EVALUATION ADULT - LIVES WITH, PROFILE
pt lives in a private house with daughter who works as a teacher. pt has 5 steps to enter +HRs and 5 steps inside to basement with bed and bath with walk in shower. pt uses walker for ambulation and owns cane. pt has grab bars in shower. prior to admission, pt was receiving assistance for ADLs./children

## 2023-05-23 NOTE — PROVIDER CONTACT NOTE (OTHER) - SITUATION
Pt DTV @0330. Pt bladder scanned @0400 and retaining 950mL. Pt DTV @0330. Pt bladder scanned @0400 and retaining 950mL. Pt OOB to commode as per PA to void.

## 2023-05-24 PROBLEM — E03.9 HYPOTHYROIDISM, UNSPECIFIED: Chronic | Status: ACTIVE | Noted: 2023-05-01

## 2023-05-24 PROBLEM — I10 ESSENTIAL (PRIMARY) HYPERTENSION: Chronic | Status: ACTIVE | Noted: 2023-05-01

## 2023-05-24 PROBLEM — M19.90 UNSPECIFIED OSTEOARTHRITIS, UNSPECIFIED SITE: Chronic | Status: ACTIVE | Noted: 2023-05-01

## 2023-05-24 PROBLEM — C50.911 MALIGNANT NEOPLASM OF UNSPECIFIED SITE OF RIGHT FEMALE BREAST: Chronic | Status: ACTIVE | Noted: 2023-05-01

## 2023-05-24 PROBLEM — E78.5 HYPERLIPIDEMIA, UNSPECIFIED: Chronic | Status: ACTIVE | Noted: 2023-05-01

## 2023-05-24 RX ADMIN — Medication 975 MILLIGRAM(S): at 21:23

## 2023-05-24 RX ADMIN — Medication 5 MILLIGRAM(S): at 17:10

## 2023-05-24 RX ADMIN — CELECOXIB 200 MILLIGRAM(S): 200 CAPSULE ORAL at 05:25

## 2023-05-24 RX ADMIN — Medication 1 TABLET(S): at 11:13

## 2023-05-24 RX ADMIN — Medication 5 MILLIGRAM(S): at 04:55

## 2023-05-24 RX ADMIN — OXYCODONE HYDROCHLORIDE 5 MILLIGRAM(S): 5 TABLET ORAL at 20:29

## 2023-05-24 RX ADMIN — APIXABAN 2.5 MILLIGRAM(S): 2.5 TABLET, FILM COATED ORAL at 17:10

## 2023-05-24 RX ADMIN — Medication 100 MILLIGRAM(S): at 13:14

## 2023-05-24 RX ADMIN — POLYETHYLENE GLYCOL 3350 17 GRAM(S): 17 POWDER, FOR SOLUTION ORAL at 21:24

## 2023-05-24 RX ADMIN — LISINOPRIL 20 MILLIGRAM(S): 2.5 TABLET ORAL at 17:10

## 2023-05-24 RX ADMIN — OXYCODONE HYDROCHLORIDE 5 MILLIGRAM(S): 5 TABLET ORAL at 19:59

## 2023-05-24 RX ADMIN — CELECOXIB 200 MILLIGRAM(S): 200 CAPSULE ORAL at 17:09

## 2023-05-24 RX ADMIN — Medication 975 MILLIGRAM(S): at 05:25

## 2023-05-24 RX ADMIN — Medication 975 MILLIGRAM(S): at 13:14

## 2023-05-24 RX ADMIN — Medication 975 MILLIGRAM(S): at 21:53

## 2023-05-24 RX ADMIN — Medication 75 MICROGRAM(S): at 04:55

## 2023-05-24 RX ADMIN — OXYCODONE HYDROCHLORIDE 5 MILLIGRAM(S): 5 TABLET ORAL at 11:56

## 2023-05-24 RX ADMIN — CELECOXIB 200 MILLIGRAM(S): 200 CAPSULE ORAL at 04:54

## 2023-05-24 RX ADMIN — OXYCODONE HYDROCHLORIDE 5 MILLIGRAM(S): 5 TABLET ORAL at 11:14

## 2023-05-24 RX ADMIN — LISINOPRIL 20 MILLIGRAM(S): 2.5 TABLET ORAL at 04:55

## 2023-05-24 RX ADMIN — SENNA PLUS 2 TABLET(S): 8.6 TABLET ORAL at 21:23

## 2023-05-24 RX ADMIN — ATORVASTATIN CALCIUM 40 MILLIGRAM(S): 80 TABLET, FILM COATED ORAL at 21:23

## 2023-05-24 RX ADMIN — APIXABAN 2.5 MILLIGRAM(S): 2.5 TABLET, FILM COATED ORAL at 04:56

## 2023-05-24 RX ADMIN — PANTOPRAZOLE SODIUM 40 MILLIGRAM(S): 20 TABLET, DELAYED RELEASE ORAL at 04:55

## 2023-05-24 RX ADMIN — Medication 100 MILLIGRAM(S): at 05:31

## 2023-05-24 RX ADMIN — Medication 975 MILLIGRAM(S): at 04:56

## 2023-05-24 NOTE — CHART NOTE - NSCHARTNOTEFT_GEN_A_CORE
Assessment:  77y Female s/p Revision LEFT Total Knee Arthroplasty     -Pain control PRN  -VTE ppx: Eliquis 2.5 BID starting POD #1  -WBAT  -Currently in BJKI  -Knee to be kept in extension until POD #3  -Patient to receive akosua brace which will be set to 0-90  -OOB with assistance as needed  -PT  -Monitor HOWIE drain output  -Ancef x 24 hours  -Prevena dressing  -FU OR cultures  -Ice and elevate  -Encourage incentive spirometry  -DC planning: Pending PT consult  -Discussed with attending, who agrees with plan      -Pain control PRN  -Complete perioperative abx  -VTE ppx:   -WBAT // TTWB // NWB  -OOB with assistance as needed  -PT/OT  -Ice and elevate  -Encourage incentive spirometry  -Medical management per primary team  // Medical co-management appreciated  -DC planning  -Discussed with attending who agrees with plan
Patient seen and evaluated in the recovery unit. Resting without complaints. Pt states pain is well tolerated. No Chest Pain, SOB, N/V/D/HA.    T(C): 36 (05-22-23 @ 19:27), Max: 37 (05-22-23 @ 11:47)  HR: 84 (05-22-23 @ 20:00) (80 - 84)  BP: 121/62 (05-22-23 @ 19:45) (121/62 - 123/84)  RR: 16 (05-22-23 @ 20:00) (16 - 18)  SpO2: 96% (05-22-23 @ 20:00) (94% - 97%)  Wt(kg): --    Exam:  Alert and Oriented, No Acute Distress.   Card: +S1/S2, RRR  Pulm: CTAB  Laterality: L lower extremity  Bulky franks knee immobilizer in place   HOWIE drain (x1) in place, draining serosanguinous blood   Prevena (x1) in place holding suction  Dressing on L Knee clean, dry and intact.   Sensory: Sensation intact to light touch   Motor: 5/5 (+) PF/DF/EHL/FHL  Calves soft, non-tender   2+ DP/PT pulse  Lower extremities warm and well-perfused, cap refill < 3 sec.     Xray:   IMPRESSION: s/p L revision TKA   Hardware aligned and symmetrical within surrounding cortex. No signs of periprosthetic changes or fractures. Closure of the skin with staples. Will review official read when available.    A/P: 77yFemale s/p L revision total knee arthroplasty. VSS. NAD.  -PT/OT- WBAT LLE in BJKI/OOB With Knee Immobilizer and assistance as needed; Knee to be kept in extension until POD #3, can begin motion POD#3   -IS bedside   -DVT PPx: Eliquis 2.5 mg BID POD1, SCD, Early OOB and Amb  -GI PPx: Protonix 40 mg   -Pain Control  -Continue Current Tx  -f/u AM labs.   -f/u OR Cx sent   -Keep knee straight until POD3   -f/u Stitzer (ord), to be set @ 0-90 degrees   -Dispo planning: PACU to Floor, pending PT/OT eval.     Narciso Hawkins PA-C  Orthopedic Surgery Team  Team Pager #[phone]
Patient visited for drain pull. Bulky franks dressing removed. Sutures d/c'd.  Hemostasis achieved, patient tolerated well, tip intact. 4x4 Dsg/tegaderm placed. [place] brace resized.    Iván Reyna PA-C  Orthopedic Surgery  Team Pager: #[phone]
Patient was fit and delivered a long leg knee orthosis rigid positional support with additional condylar control, and drop lock hinges. The medial and lateral uprights were contoured to the patient's left lower extremity using bending irons. This will help ensure proper fit and full functional benefit are achieved. Arjun was educated on the care use and function of the orthosis. Contact info was given to patient. All went without incident.   Iván YANES  [facility]  [phone]

## 2023-05-24 NOTE — PROVIDER CONTACT NOTE (OTHER) - RECOMMENDATIONS
Assist pt to commode? Straight cath?
Day RN will pass information along to night RN & have them follow up with neurosurg team.
Reattempt void in 30 minutes?

## 2023-05-25 ENCOUNTER — TRANSCRIPTION ENCOUNTER (OUTPATIENT)
Age: 78
End: 2023-05-25

## 2023-05-25 VITALS
TEMPERATURE: 97 F | OXYGEN SATURATION: 94 % | DIASTOLIC BLOOD PRESSURE: 66 MMHG | SYSTOLIC BLOOD PRESSURE: 106 MMHG | HEART RATE: 98 BPM | RESPIRATION RATE: 18 BRPM

## 2023-05-25 PROCEDURE — 87075 CULTR BACTERIA EXCEPT BLOOD: CPT

## 2023-05-25 PROCEDURE — 87206 SMEAR FLUORESCENT/ACID STAI: CPT

## 2023-05-25 PROCEDURE — 87102 FUNGUS ISOLATION CULTURE: CPT

## 2023-05-25 PROCEDURE — 87205 SMEAR GRAM STAIN: CPT

## 2023-05-25 PROCEDURE — C1713: CPT

## 2023-05-25 PROCEDURE — 80048 BASIC METABOLIC PNL TOTAL CA: CPT

## 2023-05-25 PROCEDURE — 97110 THERAPEUTIC EXERCISES: CPT

## 2023-05-25 PROCEDURE — 87070 CULTURE OTHR SPECIMN AEROBIC: CPT

## 2023-05-25 PROCEDURE — 82962 GLUCOSE BLOOD TEST: CPT

## 2023-05-25 PROCEDURE — 97530 THERAPEUTIC ACTIVITIES: CPT

## 2023-05-25 PROCEDURE — 85027 COMPLETE CBC AUTOMATED: CPT

## 2023-05-25 PROCEDURE — 97116 GAIT TRAINING THERAPY: CPT

## 2023-05-25 PROCEDURE — C1776: CPT

## 2023-05-25 PROCEDURE — 87116 MYCOBACTERIA CULTURE: CPT

## 2023-05-25 PROCEDURE — 97535 SELF CARE MNGMENT TRAINING: CPT

## 2023-05-25 PROCEDURE — 97165 OT EVAL LOW COMPLEX 30 MIN: CPT

## 2023-05-25 PROCEDURE — 73560 X-RAY EXAM OF KNEE 1 OR 2: CPT

## 2023-05-25 PROCEDURE — 86900 BLOOD TYPING SEROLOGIC ABO: CPT

## 2023-05-25 PROCEDURE — 97162 PT EVAL MOD COMPLEX 30 MIN: CPT

## 2023-05-25 PROCEDURE — 86850 RBC ANTIBODY SCREEN: CPT

## 2023-05-25 PROCEDURE — 86901 BLOOD TYPING SEROLOGIC RH(D): CPT

## 2023-05-25 PROCEDURE — 87015 SPECIMEN INFECT AGNT CONCNTJ: CPT

## 2023-05-25 PROCEDURE — S2900: CPT

## 2023-05-25 RX ORDER — OXYCODONE HYDROCHLORIDE 5 MG/1
1 TABLET ORAL
Qty: 42 | Refills: 0
Start: 2023-05-25 | End: 2023-05-31

## 2023-05-25 RX ORDER — ACETAMINOPHEN 500 MG
3 TABLET ORAL
Qty: 0 | Refills: 0 | DISCHARGE
Start: 2023-05-25

## 2023-05-25 RX ORDER — APIXABAN 2.5 MG/1
1 TABLET, FILM COATED ORAL
Qty: 60 | Refills: 0
Start: 2023-05-25 | End: 2023-06-23

## 2023-05-25 RX ORDER — CELECOXIB 200 MG/1
1 CAPSULE ORAL
Qty: 28 | Refills: 0
Start: 2023-05-25 | End: 2023-06-07

## 2023-05-25 RX ADMIN — LISINOPRIL 20 MILLIGRAM(S): 2.5 TABLET ORAL at 05:42

## 2023-05-25 RX ADMIN — CELECOXIB 200 MILLIGRAM(S): 200 CAPSULE ORAL at 18:00

## 2023-05-25 RX ADMIN — OXYCODONE HYDROCHLORIDE 10 MILLIGRAM(S): 5 TABLET ORAL at 11:15

## 2023-05-25 RX ADMIN — Medication 975 MILLIGRAM(S): at 05:44

## 2023-05-25 RX ADMIN — PANTOPRAZOLE SODIUM 40 MILLIGRAM(S): 20 TABLET, DELAYED RELEASE ORAL at 05:43

## 2023-05-25 RX ADMIN — CELECOXIB 200 MILLIGRAM(S): 200 CAPSULE ORAL at 06:14

## 2023-05-25 RX ADMIN — APIXABAN 2.5 MILLIGRAM(S): 2.5 TABLET, FILM COATED ORAL at 05:44

## 2023-05-25 RX ADMIN — Medication 975 MILLIGRAM(S): at 14:00

## 2023-05-25 RX ADMIN — APIXABAN 2.5 MILLIGRAM(S): 2.5 TABLET, FILM COATED ORAL at 17:24

## 2023-05-25 RX ADMIN — Medication 975 MILLIGRAM(S): at 13:20

## 2023-05-25 RX ADMIN — OXYCODONE HYDROCHLORIDE 10 MILLIGRAM(S): 5 TABLET ORAL at 11:45

## 2023-05-25 RX ADMIN — Medication 5 MILLIGRAM(S): at 05:44

## 2023-05-25 RX ADMIN — Medication 75 MICROGRAM(S): at 05:42

## 2023-05-25 RX ADMIN — Medication 1 TABLET(S): at 11:03

## 2023-05-25 RX ADMIN — MAGNESIUM HYDROXIDE 30 MILLILITER(S): 400 TABLET, CHEWABLE ORAL at 11:15

## 2023-05-25 RX ADMIN — CELECOXIB 200 MILLIGRAM(S): 200 CAPSULE ORAL at 05:44

## 2023-05-25 RX ADMIN — CELECOXIB 200 MILLIGRAM(S): 200 CAPSULE ORAL at 17:24

## 2023-05-25 RX ADMIN — Medication 975 MILLIGRAM(S): at 06:14

## 2023-05-25 RX ADMIN — Medication 5 MILLIGRAM(S): at 17:24

## 2023-05-25 NOTE — PROGRESS NOTE ADULT - ASSESSMENT
76 y/o FM s/p left total knee arthroplasty revision (MICHELLE) POD#2, physical therapy, d/c planning  Christine William PA-C  Orthopaedic Surgery  Team pager [phone]  [facility] [phone]  [phone]  
  Impression: Stable       Plan:   Continue present treatment                 Straight cath for UR greater than 600cc post void residual                 Out of bed, ambulate, weight bearing as tolerated                 Physical therapy follow up                 Continue to monitor    Cory Vang PA-C  Orthopaedic Surgery  Team pager [phone]  [phone]                     
76 y/o FM s/p left total knee arthroplasty revision POD#3, [place] 0-90', d/c planning  Christine William PA-C  Orthopaedic Surgery  Team pager [phone]  [facility] [phone]  [phone]

## 2023-05-25 NOTE — PROGRESS NOTE ADULT - REASON FOR ADMISSION
Left arthritic knee pain/ Left total knee replacement
Left knee pain, arthritis
Left knee pain, arthritis

## 2023-05-25 NOTE — PROGRESS NOTE ADULT - SUBJECTIVE AND OBJECTIVE BOX
Patient is a 77y old  Female who presents with a chief complaint of Left knee pain, arthritis  Patient s/p left total knee arthroplasty revision (MICHELLE) POD#3  Patient comfortable  No complaints    T(C): 36.4 (05-25-23 @ 04:33), Max: 36.9 (05-24-23 @ 12:08)  HR: 82 (05-25-23 @ 04:33) (80 - 96)  BP: 138/84 (05-25-23 @ 04:33) (106/69 - 138/84)  RR: 18 (05-25-23 @ 04:33) (18 - 18)  SpO2: 96% (05-25-23 @ 04:33) (93% - 97%)    PHYSICAL EXAM:  NAD, Alert  [Prevena Left] Knee: Dressing C/D/I; sensation grossly intact to light touch; (+) DF/PF; (+) Distal Pulses; No Calf tenderness B/L, PAS     LABS:                     11.6   14.72 )-----------( 173      ( 23 May 2023 09:54 )             36.2   05-23  137  |  103  |  24<H>  ----------------------------<  159<H>  4.3   |  22  |  0.80  Ca    8.8      23 May 2023 09:54            
 ORTHO  Patient is a 77y old  Female who presents with a chief complaint of Left total knee arthroplasty revision  (22 May 2023 20:52)    Pt. resting without complaint    VS-  T(C): 36.3 (05-23-23 @ 04:46), Max: 37 (05-22-23 @ 11:47)  HR: 86 (05-23-23 @ 04:46) (80 - 90)  BP: 105/61 (05-23-23 @ 04:46) (105/61 - 138/71)  RR: 18 (05-23-23 @ 04:46) (16 - 18)  SpO2: 95% (05-23-23 @ 04:46) (93% - 98%)  Wt(kg): --    M.S. A&O  Abd- (+) BS, Mod distention with min tenderness suprapubic region  Extremity- Left LE- immobilizer in place, Prevena dressing   Neuro-              Motor- (+) Ankle- DF/PF              Sensation- grossly intact to light touch              Calves- soft, nontender- PAS                      
Patient is a 77y old  Female who presents with a chief complaint of Left  knee pain  Patient s/p left total knee revision arthroplasty (MICHELLE)  Patient comfortable  No complaints    T(C): 36.9 (05-24-23 @ 04:38), Max: 36.9 (05-24-23 @ 00:34)  HR: 85 (05-24-23 @ 04:38) (79 - 90)  BP: 131/77 (05-24-23 @ 04:38) (101/59 - 137/78)  RR: 18 (05-24-23 @ 04:38) (18 - 18)  SpO2: 92% (05-24-23 @ 04:38) (92% - 96%)    PHYSICAL EXAM:  NAD, Alert  [Left ] Knee:[place] CARL, J/P= 45/10cc, Dressing C/D/I; sensation grossly intact to light touch; (+) DF/PF; (+) Distal Pulses; No Calf tenderness B/L, PAS   LABS                     11.6   14.72 )-----------( 173      ( 23 May 2023 09:54 )             36.2   05-23  137  |  103  |  24<H>  ----------------------------<  159<H>  4.3   |  22  |  0.80  Ca    8.8      23 May 2023 09:54

## 2023-05-25 NOTE — DISCHARGE NOTE NURSING/CASE MANAGEMENT/SOCIAL WORK - PATIENT PORTAL LINK FT
You can access the FollowMyHealth Patient Portal offered by [facility] by registering at the following website: [email or web address]. By joining [employer]’s FollowMyHealth portal, you will also be able to view your health information using other applications (apps) compatible with our system.

## 2023-06-05 LAB
CULTURE RESULTS: SIGNIFICANT CHANGE UP
CULTURE RESULTS: SIGNIFICANT CHANGE UP
SPECIMEN SOURCE: SIGNIFICANT CHANGE UP
SPECIMEN SOURCE: SIGNIFICANT CHANGE UP

## 2023-06-08 ENCOUNTER — APPOINTMENT (OUTPATIENT)
Dept: ORTHOPEDIC SURGERY | Facility: CLINIC | Age: 78
End: 2023-06-08
Payer: MEDICARE

## 2023-06-08 VITALS — BODY MASS INDEX: 28.93 KG/M2 | WEIGHT: 180 LBS | HEIGHT: 66 IN

## 2023-06-08 PROCEDURE — 99024 POSTOP FOLLOW-UP VISIT: CPT

## 2023-06-08 PROCEDURE — 73564 X-RAY EXAM KNEE 4 OR MORE: CPT | Mod: 26,LT

## 2023-06-08 NOTE — PHYSICAL EXAM
[de-identified] : Well developed, well nourished in no apparent distress, awake, alert and orientated to person, place and time with appropriate mood and affect\par Respirations are even and unlabored. Gait evaluation does not reveal a limp. There is no inguinal adenopathy. The affected limb is well-perfused with palpable pedal pulse, without skin lesions, shows a grossly normal motor and sensory examination. Incision is CDI. Knee motion is 0-90 [de-identified] : AP, lateral, sunrise knee and tunnel  x-rays of the left knee were ordered and obtained in the office and demonstrate satisfactory position and alignment of the components are present. No signs of loosening are seen.

## 2023-06-08 NOTE — HISTORY OF PRESENT ILLNESS
[de-identified] : Status-post left total knee  arthroplasty here for initial postoperative evaluation. Excellent progress is noted in terms of pain and restoration of function. Pain is well controlled with oral medications. There has been no change in medical health since discharge. The patient does require assistive devices.

## 2023-06-08 NOTE — DISCUSSION/SUMMARY
[de-identified] : The patient is doing well after joint replacement surgery. Written infectious precautions were reviewed. The patient will progress with physical therapy at this time and they will work on transitioning from requiring assistive devices for ambulation. Anti-coagulant therapy will be discontinued at 1 month post surgery for the purpose of orthopedic thromboembolism prophylaxis. Return around the 6 week anniversary from surgery for follow-up evaluation. \par \par The patient returns to the office today for staple removal.  The staples have been in since surgery. The patient has no complaints. The wound is healing well and is without evidence of infection or wound dehiscence. The wound was prepped with betadine and a staple removal tool was used to remove all staples in their entirety. Steri strips were placed over the wound and the patient was instructed to leave these in place until they fall off on their own. The patient tolerated the procedure well and there were no immediate complications. The wound edges are well adhered. The patient was instructed to continue to keep it clean. .

## 2023-06-21 LAB
CULTURE RESULTS: SIGNIFICANT CHANGE UP
SPECIMEN SOURCE: SIGNIFICANT CHANGE UP

## 2023-07-08 LAB
CULTURE RESULTS: SIGNIFICANT CHANGE UP
SPECIMEN SOURCE: SIGNIFICANT CHANGE UP

## 2023-07-13 ENCOUNTER — APPOINTMENT (OUTPATIENT)
Dept: ORTHOPEDIC SURGERY | Facility: CLINIC | Age: 78
End: 2023-07-13
Payer: MEDICARE

## 2023-07-13 VITALS — WEIGHT: 182 LBS | BODY MASS INDEX: 29.25 KG/M2 | HEIGHT: 66 IN

## 2023-07-13 PROCEDURE — 99024 POSTOP FOLLOW-UP VISIT: CPT

## 2023-07-13 NOTE — DISCUSSION/SUMMARY
[de-identified] : The patient is doing well after L TKA revision. Written infectious precautions were reviewed. WBAT. Sotp using the brace. Follow up at 6 months anniversary from surgery.

## 2023-07-13 NOTE — PHYSICAL EXAM
[de-identified] : Well developed, well nourished in no apparent distress, awake, alert and orientated to person, place and time with appropriate mood and affect\par Respirations are even and unlabored. Gait evaluation does not reveal a limp. There is no inguinal adenopathy. The affected limb is well-perfused with palpable pedal pulse, without skin lesions, shows a grossly normal motor and sensory examination. Incision is CDI. Knee motion is 0-110

## 2023-07-13 NOTE — HISTORY OF PRESENT ILLNESS
[de-identified] : Status-post left total knee  arthroplasty here for routine postoperative evaluation. Excellent progress is noted in terms of pain and restoration of function. Pain is well controlled with oral medications. There has been no change in medical health since discharge. The patient does require assistive devices.

## 2023-08-03 PROBLEM — M25.511 RIGHT SHOULDER PAIN: Status: ACTIVE | Noted: 2023-08-03

## 2023-08-04 ENCOUNTER — APPOINTMENT (OUTPATIENT)
Dept: ORTHOPEDIC SURGERY | Facility: CLINIC | Age: 78
End: 2023-08-04
Payer: MEDICARE

## 2023-08-04 DIAGNOSIS — M25.511 PAIN IN RIGHT SHOULDER: ICD-10-CM

## 2023-08-04 PROCEDURE — 99213 OFFICE O/P EST LOW 20 MIN: CPT | Mod: 24,25

## 2023-08-04 PROCEDURE — 20610 DRAIN/INJ JOINT/BURSA W/O US: CPT | Mod: 79,RT

## 2023-08-04 PROCEDURE — 72040 X-RAY EXAM NECK SPINE 2-3 VW: CPT

## 2023-08-04 RX ORDER — CELECOXIB 200 MG/1
200 CAPSULE ORAL DAILY
Qty: 14 | Refills: 0 | Status: ACTIVE | COMMUNITY
Start: 2023-08-04 | End: 1900-01-01

## 2023-08-07 NOTE — DISCUSSION/SUMMARY
[de-identified] : Sanaz Díaz is a 78-year-old female who presents the office for evaluation of her right shoulder pain.  Patient has a longstanding history of right shoulder pain.  X-rays showed severe right shoulder osteoarthritis.  Examination showed pain with shoulder range of motion and decreased shoulder range of motion. Discussed with the patient the examination and imaging findings.  Discussed with the patient the operative and nonoperative management of shoulder osteoarthritis, including arthroplasty. Patient would like to continue nonoperative management at this time.  Discussed the nonoperative management of shoulder osteoarthritis, including physical therapy, anti-inflammatories, and injections.  Patient was given referral for physical therapy.  Patient was given a prescription for Celebrex 200 mg daily for 14 days.  Patient would like a right shoulder corticosteroid injection today.  Discussed the risks of corticosteroid injections.  Patient was given a right shoulder corticosteroid injection using aseptic technique.  Patient tolerated the procedure well.  Patient will follow-up in 3 months for reevaluation and management.  Patient understanding and in agreement the plan.  All questions answered.  Plan: -Right Shoulder Corticosteroid Injections Given -Physical Therapy -Celebrex 200 mg daily for 14 days -Follow up in 3 months for reevaluation and management    Procedure Note: Right Shoulder corticosteroid injection  A Right Shoulder corticosteroid injection was given today. Risk and benefits of the injection were discussed, including but not limited to infection, fat atrophy, skin discoloration, failure to achieve desired results and elevated blood sugars.  The area was prepped in the usual sterile fashion. The injection was given with 1 cc (40 mg) Methylprednisolone and 2 cc 0.25% Bupivacaine and the patient tolerated it well.   Risk of cortisone injection are minimal but present. There is the rare risk of joint infection, skin and soft tissue thinning or whitening of the skin surrounding the injection site, thinning of nearby bone, or the risk of elevated blood glucose in diabetics. Diabetics receiving steroid injection should follow their blood sugars very closely for several days after receiving the injections.  In the event of uncontrolled elevated blood glucose, they should seek medical attention.  There's some concern that repeated use of cortisone shots may cause deterioration of the cartilage within a joint. For this reason, doctors typically limit the number of cortisone shots in a joint. The limit varies depending on the joint and the reason for treatment.  Cortisone shots usually include a corticosteroid medication and a local anesthetic. The local anesthetic helps to numb the joint at the time of the injection and last for several hours. The cortisone acts to relieve pain and inflammation but may take several days to begin to work. Some people do experience a cortisone flare after the injection and may have increased pain for 2 to 3 days after the injection, and then pain can begin to improve.  Patient was instructed to call or return for any signs or symptoms of infection after an injection including increased pain, swelling, redness or fevers.

## 2023-08-07 NOTE — PHYSICAL EXAM
[de-identified] : Constitutional: 78 year old female, alert and oriented, cooperative, in no acute distress.  HEENT  NC/AT.  Appearance: symmetric  Neck/Back Straight without deformity or instability.  Good ROM.  Chest/Respiratory  Respiratory effort: no intercostal retractions or use of accessory muscles. Nonlabored Breathing  Mental Status:  Judgment, insight: intact Orientation: oriented to time, place, and person  Neurological: Sensory and Motor are grossly intact throughout  Right Shoulder  Inspection:     Skin intact, no rashes or lesions     Non-tender to palpation over AC Joint, Deltoid/Rotator Cuff Insertions, Biceps Tendon  Range of Motion: 	Abduction - 85 	Forward flexion - 90                 IR: L5 (Left: L5)                 ER: 0 (Left: 10)  Shoulder Testing      Pain with Empty Can/Job Test      Pain with impingement tests      No pain with IR/ER shoulder  Neurologic Exam     Motor intact including 5/5 AIN/PIN/Median/Radial/Ulnar Nerve Distributions     Sensation Intact to Light Touch including Median/Radial/Ulnar nerve distributions  Vascular Exam     Hand is warm and well perfused with 2+ Radial Pulse   No cervical paraspinal muscle tenderness.  [de-identified] : XRay:  XRays of the Cervical Spine (2 Views) taken in the office today and discussed with the patient. XRays demonstrate no obvious fracture or dislocation. There are multilevel degenerative changes seen. (my personal interpretation).   XRay Shoulder: 7/31/2023, [facility] Impression: 1. 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

## 2023-08-07 NOTE — HISTORY OF PRESENT ILLNESS
[de-identified] : Sanaz Díaz is a 78-year-old female who presented to the office for evaluation of her right shoulder pain.  Patient has been experiencing right shoulder pain for about 1 year, but has been worse over the last 2 months.  Pain is located over the right lateral shoulder.  Pain is worse with shoulder motion and patient has difficulty brushing her teeth.  She tried physical therapy in [facility], but it caused shoulder pain.  She also has some neck pain, but no radiation the pain.  No falls or injuries.  No numbness or tingling.  No fevers or chills.  Of note, patient has a history of a left total knee arthroplasty that is status post revision TKA, which is now doing well.  History: HTN, Hypothyroidism

## 2023-09-01 ENCOUNTER — NON-APPOINTMENT (OUTPATIENT)
Age: 78
End: 2023-09-01

## 2023-09-29 ENCOUNTER — APPOINTMENT (OUTPATIENT)
Dept: ORTHOPEDIC SURGERY | Facility: CLINIC | Age: 78
End: 2023-09-29
Payer: MEDICARE

## 2023-09-29 VITALS — BODY MASS INDEX: 29.25 KG/M2 | WEIGHT: 182 LBS | HEIGHT: 66 IN

## 2023-09-29 DIAGNOSIS — M19.019 PRIMARY OSTEOARTHRITIS, UNSPECIFIED SHOULDER: ICD-10-CM

## 2023-09-29 PROCEDURE — 99213 OFFICE O/P EST LOW 20 MIN: CPT

## 2023-09-29 RX ORDER — CELECOXIB 200 MG/1
200 CAPSULE ORAL DAILY
Qty: 30 | Refills: 0 | Status: ACTIVE | COMMUNITY
Start: 2023-09-29 | End: 1900-01-01

## 2023-09-30 PROBLEM — M19.019 SHOULDER ARTHRITIS: Status: ACTIVE | Noted: 2023-08-04

## 2023-10-11 ENCOUNTER — APPOINTMENT (OUTPATIENT)
Dept: ORTHOPEDIC SURGERY | Facility: CLINIC | Age: 78
End: 2023-10-11
Payer: MEDICARE

## 2023-10-11 VITALS — HEART RATE: 84 BPM | DIASTOLIC BLOOD PRESSURE: 78 MMHG | SYSTOLIC BLOOD PRESSURE: 118 MMHG

## 2023-10-11 VITALS — HEIGHT: 66 IN | WEIGHT: 182 LBS | BODY MASS INDEX: 29.25 KG/M2

## 2023-10-11 DIAGNOSIS — M19.011 PRIMARY OSTEOARTHRITIS, RIGHT SHOULDER: ICD-10-CM

## 2023-10-11 DIAGNOSIS — M67.911 UNSPECIFIED DISORDER OF SYNOVIUM AND TENDON, RIGHT SHOULDER: ICD-10-CM

## 2023-10-11 PROCEDURE — 99213 OFFICE O/P EST LOW 20 MIN: CPT | Mod: 25

## 2023-10-11 PROCEDURE — 73020 X-RAY EXAM OF SHOULDER: CPT | Mod: RT

## 2023-12-20 ENCOUNTER — APPOINTMENT (OUTPATIENT)
Dept: UROLOGY | Facility: CLINIC | Age: 78
End: 2023-12-20
Payer: MEDICAID

## 2023-12-20 DIAGNOSIS — N39.3 STRESS INCONTINENCE (FEMALE) (MALE): ICD-10-CM

## 2023-12-20 DIAGNOSIS — N39.41 URGE INCONTINENCE: ICD-10-CM

## 2023-12-20 DIAGNOSIS — R39.15 URGENCY OF URINATION: ICD-10-CM

## 2023-12-20 PROCEDURE — 99214 OFFICE O/P EST MOD 30 MIN: CPT

## 2023-12-20 RX ORDER — TROSPIUM CHLORIDE 20 MG/1
20 TABLET, FILM COATED ORAL
Qty: 180 | Refills: 3 | Status: ACTIVE | COMMUNITY
Start: 2022-06-01 | End: 1900-01-01

## 2023-12-20 NOTE — PHYSICAL EXAM
[Normal Appearance] : normal appearance [Edema] : no peripheral edema [Exaggerated Use Of Accessory Muscles For Inspiration] : no accessory muscle use [Bowel Sounds] : normal bowel sounds [Vagina] : normal vaginal exam [Normal Station and Gait] : the gait and station were normal for the patient's age [Skin Color & Pigmentation] : normal skin color and pigmentation [No Focal Deficits] : no focal deficits

## 2023-12-20 NOTE — ASSESSMENT
[FreeTextEntry1] : 78 y.o. F with OAB We discussed the following options for managing the patient's lower urinary tract symptoms (LUTS) attributed to overactive bladder (OAB) (1) Behavioral modification: timed and double voiding, reduced oral fluid intake at night, avoid bladder irritants (caffeine, alcohol, smoking, spicy foods), bladder training (2) Medical therapy: Anticholinergics or B-3 agonists. Discussed mechanisms of action and side effect profile (3) Procedures: Botox, PTNS, interstim   - Trospium represcribed.  Discussed risk of cognitive decline, dementia.  Discussed at length with patient and daughter.  Also discussed alternative options, PTNS, mirabegron.  He prefers to continue trospium - UA, UCx - Kegels for mild CARMEN - PVR 0 mL  RPA 1 year

## 2023-12-20 NOTE — HISTORY OF PRESENT ILLNESS
[FreeTextEntry1] : Ms Díaz is a 78 y.o. F who presents for follow-up.  She presents for irritative voiding symptoms.  She reports nocturia x 3-4, daytime frequency every 2 hours. Some urgency without urge incontinence. Reports incontinence w/ coughing. Wears panty liners - one per day. Usually dry. Leakage with coughing, sometimes leakage withOUT coughing. Denies fevers / chills / nausea / emesis. No episodes of retention. Feels like she is emptying her bladder.  Was on trospium last year.  Reports symptoms were significantly improved on this. Would like to resume  From initial note: Her symptoms include daytime frequency q2 hours, + urgency with UI. Nocturia x 4. She wears 2 panty-liners / day. This is been going on for the past 1 year, however things have gotten worse over the past week. No dysuria. No gross hematuria. Did have a UTI approximately 10 years ago. She drinks coffee - twice / day. Glass of wine rarely. Nothing else with caffeine. Snores a lot. No leg swelling.

## 2023-12-21 ENCOUNTER — APPOINTMENT (OUTPATIENT)
Dept: ORTHOPEDIC SURGERY | Facility: CLINIC | Age: 78
End: 2023-12-21
Payer: MEDICARE

## 2023-12-21 VITALS — BODY MASS INDEX: 30.51 KG/M2 | WEIGHT: 189 LBS

## 2023-12-21 VITALS — HEIGHT: 66 IN | BODY MASS INDEX: 29.25 KG/M2 | WEIGHT: 182 LBS

## 2023-12-21 LAB
APPEARANCE: CLEAR
BACTERIA: ABNORMAL /HPF
BILIRUBIN URINE: NEGATIVE
BLOOD URINE: NEGATIVE
CAST: 1 /LPF
COLOR: YELLOW
EPITHELIAL CELLS: 15 /HPF
GLUCOSE QUALITATIVE U: NEGATIVE MG/DL
KETONES URINE: NEGATIVE MG/DL
LEUKOCYTE ESTERASE URINE: ABNORMAL
MICROSCOPIC-UA: NORMAL
NITRITE URINE: NEGATIVE
PH URINE: 6.5
PROTEIN URINE: NEGATIVE MG/DL
RED BLOOD CELLS URINE: 2 /HPF
SPECIFIC GRAVITY URINE: 1.02
UROBILINOGEN URINE: 0.2 MG/DL
WHITE BLOOD CELLS URINE: 6 /HPF

## 2023-12-21 PROCEDURE — 73564 X-RAY EXAM KNEE 4 OR MORE: CPT | Mod: RT

## 2023-12-21 PROCEDURE — 99214 OFFICE O/P EST MOD 30 MIN: CPT | Mod: 25

## 2023-12-21 PROCEDURE — 20610 DRAIN/INJ JOINT/BURSA W/O US: CPT | Mod: RT

## 2023-12-21 NOTE — HISTORY OF PRESENT ILLNESS
[de-identified] : This is very nice 78-year-old female experiencing right knee pain, which is severe in intensity. The pain substantially limits activities of daily living. Walking tolerance is reduced.  She has known right knee osteoarthritis.  The patient denies any radiation of the pain to the feet and it is not associated with numbness, tingling, or weakness.  She is thrilled with the outcome of the left total knee arthroplasty revision.

## 2023-12-21 NOTE — DISCUSSION/SUMMARY
[de-identified] : This patient has severe right knee osteoarthritis.  She is well-functioning left total knee arthroplasty revision.  I had a discussion with the patient, who is a candidate for total knee replacement. Risks, benefits and alternatives were discussed. At this point, the patient wants to hold off as the pain is not quite severe enough.  An extensive discussion was conducted on the natural history of the disease and the variety of surgical and non-surgical options available to the patient including, but not limited to non-steroidal anti-inflammatory medications, steroid injections, physical therapy, maintenance of ideal body weight, and reduction of activity.  Today we performed a right knee intra-articular cortisone injection. The patient will schedule an appointment as needed.  Informed consent for the right knee injection was obtained. All questions were answered. A time out was performed. The right knee was prepped and draped in sterile fashion. Using sterile technique, the right knee was injected with 80mg of Kenalog, 4cc of 1% lidocaine, 4cc of 0.25% marcaine using a 21-gauge needle. A sterile dressing was applied. Post injection instructions were reviewed. The patient tolerated the procedure well.

## 2023-12-21 NOTE — PHYSICAL EXAM
[de-identified] : Patient is well nourished, well-developed, in no acute distress, with appropriate mood and affect. The patient is oriented to time, place, and person. Respirations are even and unlabored. Gait evaluation does reveal a limp. There is no inguinal adenopathy. Bilateral limbs are well-perfused, without skin lesions, shows a grossly normal motor and sensory examination. The right knee motion is significantly reduced and does cause significant pain. The right knee moves from 0 to 125 degrees. The knee is stable within that range-of-motion to AP and ML stress. The alignment of the knee is 5 degrees valgus. Muscle strength is normal. Pedal pulses are palpable. Hip examination was negative. The left knee motion is painless and the left knee moves from 0 to 125 degrees. The knee is stable within that range-of-motion to AP and ML stress. The alignment of the knee is neutral.  Well-healed midline surgical scar.  Muscle strength is normal. Pedal pulses are palpable. Hip examination was negative. [de-identified] : AP, lateral, tunnel, and sunrise knee x-rays of the left knee were ordered and obtained in the office and demonstrate satisfactory position and alignment of the components are present. No signs of loosening are seen.  Long standing knee, AP knee, lateral knee, and patellar views of the right knee were ordered and taken in the office and demonstrate degenerative joint disease of the knee with joint space narrowing, osteophyte formation, and subchondral sclerosis.

## 2023-12-21 NOTE — REASON FOR VISIT
[Follow-Up Visit] : a follow-up visit for [Artificial Knee Joint] : an artificial knee joint [Knee Pain] : knee pain [Osteoarthritis, Knee] : osteoarthritis of the knee

## 2023-12-22 LAB — BACTERIA UR CULT: NORMAL

## 2024-03-19 LAB
APPEARANCE: ABNORMAL
BACTERIA: ABNORMAL /HPF
BILIRUBIN URINE: NEGATIVE
BLOOD URINE: NEGATIVE
CAST: 1 /LPF
COLOR: YELLOW
EPITHELIAL CELLS: 8 /HPF
GLUCOSE QUALITATIVE U: NEGATIVE MG/DL
KETONES URINE: NEGATIVE MG/DL
LEUKOCYTE ESTERASE URINE: ABNORMAL
MICROSCOPIC-UA: NORMAL
NITRITE URINE: POSITIVE
PH URINE: 7
PROTEIN URINE: NEGATIVE MG/DL
RED BLOOD CELLS URINE: 2 /HPF
SPECIFIC GRAVITY URINE: 1.02
UROBILINOGEN URINE: 0.2 MG/DL
WHITE BLOOD CELLS URINE: 641 /HPF

## 2024-03-21 ENCOUNTER — APPOINTMENT (OUTPATIENT)
Dept: ORTHOPEDIC SURGERY | Facility: CLINIC | Age: 79
End: 2024-03-21
Payer: MEDICARE

## 2024-03-21 VITALS — HEIGHT: 66 IN | BODY MASS INDEX: 31.82 KG/M2 | WEIGHT: 198 LBS

## 2024-03-21 DIAGNOSIS — Z96.652 PRESENCE OF LEFT ARTIFICIAL KNEE JOINT: ICD-10-CM

## 2024-03-21 DIAGNOSIS — M17.11 UNILATERAL PRIMARY OSTEOARTHRITIS, RIGHT KNEE: ICD-10-CM

## 2024-03-21 DIAGNOSIS — Z96.652 PAIN DUE TO INTERNAL ORTHOPEDIC PROSTHETIC DEVICES, IMPLANTS AND GRAFTS, INITIAL ENCOUNTER: ICD-10-CM

## 2024-03-21 DIAGNOSIS — T84.84XA PAIN DUE TO INTERNAL ORTHOPEDIC PROSTHETIC DEVICES, IMPLANTS AND GRAFTS, INITIAL ENCOUNTER: ICD-10-CM

## 2024-03-21 LAB — BACTERIA UR CULT: ABNORMAL

## 2024-03-21 PROCEDURE — 99214 OFFICE O/P EST MOD 30 MIN: CPT | Mod: 25

## 2024-03-21 PROCEDURE — 20610 DRAIN/INJ JOINT/BURSA W/O US: CPT | Mod: RT

## 2024-03-21 RX ORDER — CEPHALEXIN 500 MG/1
500 CAPSULE ORAL
Qty: 10 | Refills: 0 | Status: ACTIVE | COMMUNITY
Start: 2024-03-21 | End: 1900-01-01

## 2024-03-22 NOTE — HISTORY OF PRESENT ILLNESS
[de-identified] : This is very nice 78-year-old female experiencing right knee pain, which is severe in intensity.  She has known right knee osteoarthritis.  The pain substantially limits activities of daily living. Walking tolerance is reduced.  She has known right knee osteoarthritis.  The patient denies any radiation of the pain to the feet and it is not associated with numbness, tingling, or weakness.  She is thrilled with the outcome of the left total knee arthroplasty revision.

## 2024-03-22 NOTE — DISCUSSION/SUMMARY
[de-identified] : This patient has severe right knee osteoarthritis.I had a discussion with the patient, who is a candidate for total knee replacement. Risks, benefits and alternatives were discussed. At this point, the patient wants to hold off as the pain is not quite severe enough.  An extensive discussion was conducted on the natural history of the disease and the variety of surgical and non-surgical options available to the patient including, but not limited to non-steroidal anti-inflammatory medications, steroid injections, physical therapy, maintenance of ideal body weight, and reduction of activity.  I recommended and offered a prescription for Mobic but she prefers not to take this medication.  Today we performed a right knee intra-articular cortisone injection. The patient will schedule an appointment as needed.  Informed consent for the right knee injection was obtained. All risks, benefits and alternatives were discussed. These included but were not limited to bleeding, infection, and allergic reaction.  All questions were answered. The right knee was prepped and draped in sterile fashion. Using sterile technique, the right knee was injected with 80mg of Kenalog, 4cc of 1% lidocaine, 4cc of 0.25% marcaine using a 21-gauge needle. A sterile dressing was applied. Post injection instructions were reviewed. The patient tolerated the procedure well.

## 2024-03-22 NOTE — PHYSICAL EXAM
[de-identified] : Patient is well nourished, well-developed, in no acute distress, with appropriate mood and affect. The patient is oriented to time, place, and person. Respirations are even and unlabored. Gait evaluation does reveal a limp. There is no inguinal adenopathy. Bilateral limbs are well-perfused, without skin lesions, shows a grossly normal motor and sensory examination. The right knee motion is significantly reduced and does cause significant pain. The right knee moves from 0 to 125 degrees. The knee is stable within that range-of-motion to AP and ML stress. The alignment of the knee is 5 degrees valgus. Muscle strength is normal. Pedal pulses are palpable. Hip examination was negative. The left knee motion is painless and the left knee moves from 0 to 125 degrees. The knee is stable within that range-of-motion to AP and ML stress. The alignment of the knee is neutral.  Well-healed midline surgical scar.  Muscle strength is normal. Pedal pulses are palpable. Hip examination was negative.

## 2024-04-08 ENCOUNTER — APPOINTMENT (OUTPATIENT)
Dept: UROLOGY | Facility: CLINIC | Age: 79
End: 2024-04-08

## 2024-04-15 ENCOUNTER — APPOINTMENT (OUTPATIENT)
Dept: UROLOGY | Facility: CLINIC | Age: 79
End: 2024-04-15

## 2024-07-23 ENCOUNTER — APPOINTMENT (OUTPATIENT)
Dept: ORTHOPEDIC SURGERY | Facility: CLINIC | Age: 79
End: 2024-07-23

## 2024-12-04 ENCOUNTER — APPOINTMENT (OUTPATIENT)
Dept: ORTHOPEDIC SURGERY | Facility: CLINIC | Age: 79
End: 2024-12-04
Payer: MEDICARE

## 2024-12-04 VITALS — HEIGHT: 66 IN | WEIGHT: 198 LBS | BODY MASS INDEX: 31.82 KG/M2

## 2024-12-04 DIAGNOSIS — M17.11 UNILATERAL PRIMARY OSTEOARTHRITIS, RIGHT KNEE: ICD-10-CM

## 2024-12-04 PROCEDURE — 73564 X-RAY EXAM KNEE 4 OR MORE: CPT | Mod: RT

## 2024-12-04 PROCEDURE — 20610 DRAIN/INJ JOINT/BURSA W/O US: CPT | Mod: RT

## 2024-12-04 PROCEDURE — 99214 OFFICE O/P EST MOD 30 MIN: CPT | Mod: 25

## 2024-12-09 ENCOUNTER — APPOINTMENT (OUTPATIENT)
Dept: UROLOGY | Facility: CLINIC | Age: 79
End: 2024-12-09

## 2025-01-03 ENCOUNTER — APPOINTMENT (OUTPATIENT)
Dept: ORTHOPEDIC SURGERY | Facility: CLINIC | Age: 80
End: 2025-01-03
Payer: MEDICARE

## 2025-01-03 VITALS — HEIGHT: 66 IN | BODY MASS INDEX: 31.82 KG/M2 | WEIGHT: 198 LBS

## 2025-01-03 DIAGNOSIS — M67.911 UNSPECIFIED DISORDER OF SYNOVIUM AND TENDON, RIGHT SHOULDER: ICD-10-CM

## 2025-01-03 DIAGNOSIS — M19.011 PRIMARY OSTEOARTHRITIS, RIGHT SHOULDER: ICD-10-CM

## 2025-01-03 DIAGNOSIS — M25.511 PAIN IN RIGHT SHOULDER: ICD-10-CM

## 2025-01-03 PROCEDURE — 99213 OFFICE O/P EST LOW 20 MIN: CPT

## 2025-01-11 ENCOUNTER — OUTPATIENT (OUTPATIENT)
Dept: OUTPATIENT SERVICES | Facility: HOSPITAL | Age: 80
LOS: 1 days | End: 2025-01-11
Payer: COMMERCIAL

## 2025-01-11 ENCOUNTER — APPOINTMENT (OUTPATIENT)
Dept: CT IMAGING | Facility: CLINIC | Age: 80
End: 2025-01-11
Payer: MEDICARE

## 2025-01-11 DIAGNOSIS — Z90.710 ACQUIRED ABSENCE OF BOTH CERVIX AND UTERUS: Chronic | ICD-10-CM

## 2025-01-11 DIAGNOSIS — M19.011 PRIMARY OSTEOARTHRITIS, RIGHT SHOULDER: ICD-10-CM

## 2025-01-11 DIAGNOSIS — Z96.643 PRESENCE OF ARTIFICIAL HIP JOINT, BILATERAL: Chronic | ICD-10-CM

## 2025-01-11 DIAGNOSIS — Z98.890 OTHER SPECIFIED POSTPROCEDURAL STATES: Chronic | ICD-10-CM

## 2025-01-11 DIAGNOSIS — Z90.49 ACQUIRED ABSENCE OF OTHER SPECIFIED PARTS OF DIGESTIVE TRACT: Chronic | ICD-10-CM

## 2025-01-11 DIAGNOSIS — Z90.89 ACQUIRED ABSENCE OF OTHER ORGANS: Chronic | ICD-10-CM

## 2025-01-11 DIAGNOSIS — Z96.652 PRESENCE OF LEFT ARTIFICIAL KNEE JOINT: Chronic | ICD-10-CM

## 2025-01-11 PROCEDURE — 73200 CT UPPER EXTREMITY W/O DYE: CPT

## 2025-01-11 PROCEDURE — 73200 CT UPPER EXTREMITY W/O DYE: CPT | Mod: 26,RT

## 2025-01-16 ENCOUNTER — APPOINTMENT (OUTPATIENT)
Dept: ORTHOPEDIC SURGERY | Facility: CLINIC | Age: 80
End: 2025-01-16

## 2025-01-24 ENCOUNTER — APPOINTMENT (OUTPATIENT)
Dept: ORTHOPEDIC SURGERY | Facility: CLINIC | Age: 80
End: 2025-01-24
Payer: MEDICARE

## 2025-01-24 VITALS — WEIGHT: 190 LBS | HEIGHT: 65.5 IN | BODY MASS INDEX: 31.27 KG/M2

## 2025-01-24 DIAGNOSIS — M19.011 PRIMARY OSTEOARTHRITIS, RIGHT SHOULDER: ICD-10-CM

## 2025-01-24 DIAGNOSIS — M67.911 UNSPECIFIED DISORDER OF SYNOVIUM AND TENDON, RIGHT SHOULDER: ICD-10-CM

## 2025-01-24 PROCEDURE — 99214 OFFICE O/P EST MOD 30 MIN: CPT

## 2025-01-24 RX ORDER — TRAMADOL HYDROCHLORIDE 50 MG/1
50 TABLET, COATED ORAL EVERY 8 HOURS
Qty: 15 | Refills: 0 | Status: ACTIVE | COMMUNITY
Start: 2025-01-24 | End: 1900-01-01

## 2025-02-12 ENCOUNTER — NON-APPOINTMENT (OUTPATIENT)
Age: 80
End: 2025-02-12

## 2025-02-14 ENCOUNTER — OUTPATIENT (OUTPATIENT)
Dept: OUTPATIENT SERVICES | Facility: HOSPITAL | Age: 80
LOS: 1 days | End: 2025-02-14

## 2025-02-14 VITALS
WEIGHT: 199.96 LBS | SYSTOLIC BLOOD PRESSURE: 138 MMHG | DIASTOLIC BLOOD PRESSURE: 80 MMHG | TEMPERATURE: 98 F | RESPIRATION RATE: 16 BRPM | OXYGEN SATURATION: 99 % | HEART RATE: 84 BPM | HEIGHT: 65 IN

## 2025-02-14 DIAGNOSIS — Z96.652 PRESENCE OF LEFT ARTIFICIAL KNEE JOINT: Chronic | ICD-10-CM

## 2025-02-14 DIAGNOSIS — N32.81 OVERACTIVE BLADDER: ICD-10-CM

## 2025-02-14 DIAGNOSIS — Z98.890 OTHER SPECIFIED POSTPROCEDURAL STATES: Chronic | ICD-10-CM

## 2025-02-14 DIAGNOSIS — M19.211 SECONDARY OSTEOARTHRITIS, RIGHT SHOULDER: ICD-10-CM

## 2025-02-14 DIAGNOSIS — I10 ESSENTIAL (PRIMARY) HYPERTENSION: ICD-10-CM

## 2025-02-14 DIAGNOSIS — E03.9 HYPOTHYROIDISM, UNSPECIFIED: ICD-10-CM

## 2025-02-14 DIAGNOSIS — Z96.643 PRESENCE OF ARTIFICIAL HIP JOINT, BILATERAL: Chronic | ICD-10-CM

## 2025-02-14 DIAGNOSIS — Z90.89 ACQUIRED ABSENCE OF OTHER ORGANS: Chronic | ICD-10-CM

## 2025-02-14 DIAGNOSIS — Z90.710 ACQUIRED ABSENCE OF BOTH CERVIX AND UTERUS: Chronic | ICD-10-CM

## 2025-02-14 DIAGNOSIS — Z90.49 ACQUIRED ABSENCE OF OTHER SPECIFIED PARTS OF DIGESTIVE TRACT: Chronic | ICD-10-CM

## 2025-02-14 LAB
ANION GAP SERPL CALC-SCNC: 14 MMOL/L — SIGNIFICANT CHANGE UP (ref 7–14)
APPEARANCE UR: ABNORMAL
BILIRUB UR-MCNC: NEGATIVE — SIGNIFICANT CHANGE UP
BLD GP AB SCN SERPL QL: NEGATIVE — SIGNIFICANT CHANGE UP
BUN SERPL-MCNC: 21 MG/DL — SIGNIFICANT CHANGE UP (ref 7–23)
CALCIUM SERPL-MCNC: 10.1 MG/DL — SIGNIFICANT CHANGE UP (ref 8.4–10.5)
CHLORIDE SERPL-SCNC: 101 MMOL/L — SIGNIFICANT CHANGE UP (ref 98–107)
CO2 SERPL-SCNC: 25 MMOL/L — SIGNIFICANT CHANGE UP (ref 22–31)
COLOR SPEC: YELLOW — SIGNIFICANT CHANGE UP
CREAT SERPL-MCNC: 0.84 MG/DL — SIGNIFICANT CHANGE UP (ref 0.5–1.3)
DIFF PNL FLD: NEGATIVE — SIGNIFICANT CHANGE UP
EGFR: 71 ML/MIN/1.73M2 — SIGNIFICANT CHANGE UP
GLUCOSE SERPL-MCNC: 78 MG/DL — SIGNIFICANT CHANGE UP (ref 70–99)
GLUCOSE UR QL: NEGATIVE MG/DL — SIGNIFICANT CHANGE UP
KETONES UR-MCNC: NEGATIVE MG/DL — SIGNIFICANT CHANGE UP
LEUKOCYTE ESTERASE UR-ACNC: ABNORMAL
NITRITE UR-MCNC: POSITIVE
PH UR: 6.5 — SIGNIFICANT CHANGE UP (ref 5–8)
POTASSIUM SERPL-MCNC: 3.4 MMOL/L — LOW (ref 3.5–5.3)
POTASSIUM SERPL-SCNC: 3.4 MMOL/L — LOW (ref 3.5–5.3)
PROT UR-MCNC: NEGATIVE MG/DL — SIGNIFICANT CHANGE UP
RH IG SCN BLD-IMP: NEGATIVE — SIGNIFICANT CHANGE UP
RH IG SCN BLD-IMP: NEGATIVE — SIGNIFICANT CHANGE UP
SODIUM SERPL-SCNC: 140 MMOL/L — SIGNIFICANT CHANGE UP (ref 135–145)
SP GR SPEC: 1.02 — SIGNIFICANT CHANGE UP (ref 1–1.03)
UROBILINOGEN FLD QL: 0.2 MG/DL — SIGNIFICANT CHANGE UP (ref 0.2–1)

## 2025-02-14 NOTE — H&P PST ADULT - PROBLEM SELECTOR PLAN 5
Pre-op Delirium Screening Questionnaire:    Patient eligible for rosa risk screen age>75?  yes  Health care proxy paperwork given to patient? Yes (all patients should be given the packet to fill out at home and return on day of surgery to pre-op RN)  Impaired mobility (ie: uses cane, walker, wheelchair, or assist device)? No  Known dementia diagnosis? No  Impaired functional status (METS<4)? No  Malnutrition BMI<20? No

## 2025-02-14 NOTE — H&P PST ADULT - PATIENT ON (OXYGEN DELIVERY METHOD)
[FreeTextEntry1] : Suresh Appiah presents for evaluation of subjectively diminished hearing from the right ear. Otoscopic exam was normal. Audiogram was performed and reviewed showing normal hearing and type A tymps AU. We discussed that eustachian tube dysfunction could be causing his symptoms. Nasopharyngoscopy was performed given his unilateral symptoms and this was normal. We will start nasal steroid spray.\par \par - Start flonase 2 sprays BID to each nostril.\par - Follow up in 1 month. room air

## 2025-02-14 NOTE — H&P PST ADULT - NSICDXPASTMEDICALHX_GEN_ALL_CORE_FT
PAST MEDICAL HISTORY:  Breast cancer, right     HLD (hyperlipidemia)     HTN (hypertension)     Hypothyroidism     OA (osteoarthritis)     Overactive bladder

## 2025-02-14 NOTE — H&P PST ADULT - PROBLEM SELECTOR PLAN 4
Patient instructed to take trospium with a sip of water on the morning of procedure. Patient verbalized understanding.

## 2025-02-14 NOTE — H&P PST ADULT - PROBLEM SELECTOR PLAN 1
Patient tentatively scheduled for right reversal total shoulder replacement with PSI guides on 3/6/25.  Pre-op instructions provided. Pt given verbal and written instructions with teach back on chlorhexidine wash and pepcid. Pt verbalized understanding with return demonstration.   LUDIVINA precautions    PST CBC anemia w/ reflex, BMP, T&S, UA, urine c/s - pending

## 2025-02-14 NOTE — H&P PST ADULT - PROBLEM SELECTOR PLAN 2
Patient instructed to take Lisinopril and HCTZ with a sip of water on the morning of procedure. Patient verbalized understanding.

## 2025-02-14 NOTE — H&P PST ADULT - HISTORY OF PRESENT ILLNESS
79 year old female with PMH of HTN, HLD,  hypothyroidism, Breast cancer right (s/p lumpectomy and radiation 2017)   presents to Presurgical testing with diagnosis of  secondary osteoarthritis right shoulder  scheduled for right reversal total shoulder replacement with PSI guides.    Patient with right shoulder pain for the last few years. Tried conservative treatment but no relief.

## 2025-02-15 LAB
BASOPHILS # BLD AUTO: 0.04 K/UL — SIGNIFICANT CHANGE UP (ref 0–0.2)
BASOPHILS NFR BLD AUTO: 0.5 % — SIGNIFICANT CHANGE UP (ref 0–2)
CULTURE RESULTS: ABNORMAL
EOSINOPHIL # BLD AUTO: 0.22 K/UL — SIGNIFICANT CHANGE UP (ref 0–0.5)
EOSINOPHIL NFR BLD AUTO: 2.9 % — SIGNIFICANT CHANGE UP (ref 0–6)
HCT VFR BLD CALC: 42.8 % — SIGNIFICANT CHANGE UP (ref 34.5–45)
HGB BLD-MCNC: 13.4 G/DL — SIGNIFICANT CHANGE UP (ref 11.5–15.5)
IMM GRANULOCYTES NFR BLD AUTO: 0.1 % — SIGNIFICANT CHANGE UP (ref 0–0.9)
LYMPHOCYTES # BLD AUTO: 1.35 K/UL — SIGNIFICANT CHANGE UP (ref 1–3.3)
LYMPHOCYTES # BLD AUTO: 17.9 % — SIGNIFICANT CHANGE UP (ref 13–44)
MCHC RBC-ENTMCNC: 28.3 PG — SIGNIFICANT CHANGE UP (ref 27–34)
MCHC RBC-ENTMCNC: 31.3 G/DL — LOW (ref 32–36)
MCV RBC AUTO: 90.3 FL — SIGNIFICANT CHANGE UP (ref 80–100)
MONOCYTES # BLD AUTO: 0.59 K/UL — SIGNIFICANT CHANGE UP (ref 0–0.9)
MONOCYTES NFR BLD AUTO: 7.8 % — SIGNIFICANT CHANGE UP (ref 2–14)
NEUTROPHILS # BLD AUTO: 5.33 K/UL — SIGNIFICANT CHANGE UP (ref 1.8–7.4)
NEUTROPHILS NFR BLD AUTO: 70.8 % — SIGNIFICANT CHANGE UP (ref 43–77)
PLATELET # BLD AUTO: 194 K/UL — SIGNIFICANT CHANGE UP (ref 150–400)
RBC # BLD: 4.74 M/UL — SIGNIFICANT CHANGE UP (ref 3.8–5.2)
RBC # FLD: 14.6 % — HIGH (ref 10.3–14.5)
SPECIMEN SOURCE: SIGNIFICANT CHANGE UP
WBC # BLD: 7.54 K/UL — SIGNIFICANT CHANGE UP (ref 3.8–10.5)
WBC # FLD AUTO: 7.54 K/UL — SIGNIFICANT CHANGE UP (ref 3.8–10.5)

## 2025-02-20 RX ORDER — SULFAMETHOXAZOLE AND TRIMETHOPRIM 800; 160 MG/1; MG/1
800-160 TABLET ORAL TWICE DAILY
Qty: 10 | Refills: 0 | Status: ACTIVE | COMMUNITY
Start: 2025-02-20 | End: 1900-01-01

## 2025-03-06 ENCOUNTER — INPATIENT (INPATIENT)
Facility: HOSPITAL | Age: 80
LOS: 0 days | Discharge: HOME | End: 2025-03-06
Attending: ORTHOPAEDIC SURGERY | Admitting: ORTHOPAEDIC SURGERY
Payer: MEDICARE

## 2025-03-06 ENCOUNTER — APPOINTMENT (OUTPATIENT)
Dept: ORTHOPEDIC SURGERY | Facility: HOSPITAL | Age: 80
End: 2025-03-06

## 2025-03-06 VITALS
HEIGHT: 65 IN | WEIGHT: 199.96 LBS | DIASTOLIC BLOOD PRESSURE: 74 MMHG | RESPIRATION RATE: 14 BRPM | SYSTOLIC BLOOD PRESSURE: 121 MMHG | TEMPERATURE: 99 F | OXYGEN SATURATION: 95 % | HEART RATE: 92 BPM

## 2025-03-06 VITALS
RESPIRATION RATE: 16 BRPM | DIASTOLIC BLOOD PRESSURE: 69 MMHG | OXYGEN SATURATION: 97 % | HEART RATE: 92 BPM | SYSTOLIC BLOOD PRESSURE: 115 MMHG | TEMPERATURE: 98 F

## 2025-03-06 DIAGNOSIS — Z90.49 ACQUIRED ABSENCE OF OTHER SPECIFIED PARTS OF DIGESTIVE TRACT: Chronic | ICD-10-CM

## 2025-03-06 DIAGNOSIS — Z90.710 ACQUIRED ABSENCE OF BOTH CERVIX AND UTERUS: Chronic | ICD-10-CM

## 2025-03-06 DIAGNOSIS — Z90.89 ACQUIRED ABSENCE OF OTHER ORGANS: Chronic | ICD-10-CM

## 2025-03-06 DIAGNOSIS — Z98.890 OTHER SPECIFIED POSTPROCEDURAL STATES: Chronic | ICD-10-CM

## 2025-03-06 DIAGNOSIS — Z96.643 PRESENCE OF ARTIFICIAL HIP JOINT, BILATERAL: Chronic | ICD-10-CM

## 2025-03-06 DIAGNOSIS — Z96.652 PRESENCE OF LEFT ARTIFICIAL KNEE JOINT: Chronic | ICD-10-CM

## 2025-03-06 DIAGNOSIS — M19.211 SECONDARY OSTEOARTHRITIS, RIGHT SHOULDER: ICD-10-CM

## 2025-03-06 PROCEDURE — 73030 X-RAY EXAM OF SHOULDER: CPT | Mod: 26,RT

## 2025-03-06 PROCEDURE — 23472 RECONSTRUCT SHOULDER JOINT: CPT | Mod: RT

## 2025-03-06 DEVICE — SCREW RVS CNTRL 6.5X30MM ST: Type: IMPLANTABLE DEVICE | Status: FUNCTIONAL

## 2025-03-06 DEVICE — PIN REVERSE SHOULDER: Type: IMPLANTABLE DEVICE | Status: FUNCTIONAL

## 2025-03-06 DEVICE — IMPLANTABLE DEVICE: Type: IMPLANTABLE DEVICE | Status: FUNCTIONAL

## 2025-03-06 DEVICE — SCREW COMP LOCKING 3.5 HEX 4.75X15MM: Type: IMPLANTABLE DEVICE | Status: FUNCTIONAL

## 2025-03-06 DEVICE — BASEPLATE AUG COMPR ADP SM: Type: IMPLANTABLE DEVICE | Status: FUNCTIONAL

## 2025-03-06 DEVICE — SCREW COMP LOKG 3.5 HEX 4.75X20MM: Type: IMPLANTABLE DEVICE | Status: FUNCTIONAL

## 2025-03-06 DEVICE — BEARING HUMERAL 36MM STD: Type: IMPLANTABLE DEVICE | Status: FUNCTIONAL

## 2025-03-06 DEVICE — GLENOSPHERE 36MM DIA OF CURVE: Type: IMPLANTABLE DEVICE | Status: FUNCTIONAL

## 2025-03-06 DEVICE — TRAY HUMERAL PLUS 3 STD: Type: IMPLANTABLE DEVICE | Status: FUNCTIONAL

## 2025-03-06 RX ORDER — HYDROMORPHONE/SOD CHLOR,ISO/PF 2 MG/10 ML
0.5 SYRINGE (ML) INJECTION
Refills: 0 | Status: DISCONTINUED | OUTPATIENT
Start: 2025-03-06 | End: 2025-03-06

## 2025-03-06 RX ORDER — POLYETHYLENE GLYCOL 3350 17 G/17G
17 POWDER, FOR SOLUTION ORAL
Qty: 0 | Refills: 0 | DISCHARGE
Start: 2025-03-06

## 2025-03-06 RX ORDER — CELECOXIB 50 MG/1
200 CAPSULE ORAL EVERY 12 HOURS
Refills: 0 | Status: DISCONTINUED | OUTPATIENT
Start: 2025-03-07 | End: 2025-03-06

## 2025-03-06 RX ORDER — KETOROLAC TROMETHAMINE 30 MG/ML
15 INJECTION, SOLUTION INTRAMUSCULAR; INTRAVENOUS EVERY 6 HOURS
Refills: 0 | Status: DISCONTINUED | OUTPATIENT
Start: 2025-03-06 | End: 2025-03-06

## 2025-03-06 RX ORDER — OXYCODONE HYDROCHLORIDE 30 MG/1
1 TABLET ORAL
Qty: 20 | Refills: 0
Start: 2025-03-06 | End: 2025-03-10

## 2025-03-06 RX ORDER — KETOROLAC TROMETHAMINE 30 MG/ML
15 INJECTION, SOLUTION INTRAMUSCULAR; INTRAVENOUS ONCE
Refills: 0 | Status: DISCONTINUED | OUTPATIENT
Start: 2025-03-06 | End: 2025-03-06

## 2025-03-06 RX ORDER — TRAMADOL HYDROCHLORIDE 50 MG/1
25 TABLET, FILM COATED ORAL EVERY 6 HOURS
Refills: 0 | Status: DISCONTINUED | OUTPATIENT
Start: 2025-03-06 | End: 2025-03-06

## 2025-03-06 RX ORDER — OXYCODONE HYDROCHLORIDE 30 MG/1
2.5 TABLET ORAL EVERY 4 HOURS
Refills: 0 | Status: DISCONTINUED | OUTPATIENT
Start: 2025-03-06 | End: 2025-03-06

## 2025-03-06 RX ORDER — OXYCODONE HYDROCHLORIDE 30 MG/1
5 TABLET ORAL EVERY 4 HOURS
Refills: 0 | Status: DISCONTINUED | OUTPATIENT
Start: 2025-03-06 | End: 2025-03-06

## 2025-03-06 RX ORDER — CEFAZOLIN SODIUM IN 0.9 % NACL 3 G/100 ML
2000 INTRAVENOUS SOLUTION, PIGGYBACK (ML) INTRAVENOUS EVERY 8 HOURS
Refills: 0 | Status: COMPLETED | OUTPATIENT
Start: 2025-03-06 | End: 2025-03-06

## 2025-03-06 RX ORDER — NALOXONE HYDROCHLORIDE 0.4 MG/ML
1 INJECTION, SOLUTION INTRAMUSCULAR; INTRAVENOUS; SUBCUTANEOUS
Qty: 1 | Refills: 0
Start: 2025-03-06

## 2025-03-06 RX ORDER — SENNA 187 MG
2 TABLET ORAL
Qty: 0 | Refills: 0 | DISCHARGE
Start: 2025-03-06

## 2025-03-06 RX ORDER — ONDANSETRON HCL/PF 4 MG/2 ML
4 VIAL (ML) INJECTION ONCE
Refills: 0 | Status: DISCONTINUED | OUTPATIENT
Start: 2025-03-06 | End: 2025-03-06

## 2025-03-06 RX ORDER — ASPIRIN 325 MG
1 TABLET ORAL
Qty: 42 | Refills: 0
Start: 2025-03-06 | End: 2025-04-16

## 2025-03-06 RX ORDER — SODIUM CHLORIDE 9 G/1000ML
1000 INJECTION, SOLUTION INTRAVENOUS
Refills: 0 | Status: DISCONTINUED | OUTPATIENT
Start: 2025-03-06 | End: 2025-03-06

## 2025-03-06 RX ORDER — POLYETHYLENE GLYCOL 3350 17 G/17G
17 POWDER, FOR SOLUTION ORAL AT BEDTIME
Refills: 0 | Status: DISCONTINUED | OUTPATIENT
Start: 2025-03-06 | End: 2025-03-06

## 2025-03-06 RX ORDER — HYDROMORPHONE/SOD CHLOR,ISO/PF 2 MG/10 ML
0.5 SYRINGE (ML) INJECTION ONCE
Refills: 0 | Status: DISCONTINUED | OUTPATIENT
Start: 2025-03-06 | End: 2025-03-06

## 2025-03-06 RX ORDER — SENNA 187 MG
2 TABLET ORAL AT BEDTIME
Refills: 0 | Status: DISCONTINUED | OUTPATIENT
Start: 2025-03-06 | End: 2025-03-06

## 2025-03-06 RX ORDER — TRAMADOL HYDROCHLORIDE 50 MG/1
0.5 TABLET, FILM COATED ORAL
Qty: 10 | Refills: 0
Start: 2025-03-06 | End: 2025-03-10

## 2025-03-06 RX ORDER — OXYCODONE HYDROCHLORIDE 30 MG/1
5 TABLET ORAL ONCE
Refills: 0 | Status: DISCONTINUED | OUTPATIENT
Start: 2025-03-06 | End: 2025-03-06

## 2025-03-06 RX ORDER — ASPIRIN 325 MG
325 TABLET ORAL DAILY
Refills: 0 | Status: DISCONTINUED | OUTPATIENT
Start: 2025-03-06 | End: 2025-03-06

## 2025-03-06 RX ADMIN — Medication 1 LOZENGE: at 12:38

## 2025-03-06 RX ADMIN — Medication 500 MILLILITER(S): at 10:57

## 2025-03-06 RX ADMIN — KETOROLAC TROMETHAMINE 15 MILLIGRAM(S): 30 INJECTION, SOLUTION INTRAMUSCULAR; INTRAVENOUS at 13:09

## 2025-03-06 RX ADMIN — OXYCODONE HYDROCHLORIDE 5 MILLIGRAM(S): 30 TABLET ORAL at 12:54

## 2025-03-06 NOTE — PHYSICAL THERAPY INITIAL EVALUATION ADULT - MANUAL MUSCLE TESTING RESULTS, REHAB EVAL
Patients left upper extremity and bilateral lower extremities grossly 4+/5 upon MMT and functional assessment, right upper extremity not assessed due to non weight bearing precautions

## 2025-03-06 NOTE — ASU DISCHARGE PLAN (ADULT/PEDIATRIC) - FOLLOW UP APPOINTMENTS
may also call Recovery Room (PACU) 24/7 @ [phone]/[facility]

## 2025-03-06 NOTE — PHYSICAL THERAPY INITIAL EVALUATION ADULT - LEVEL OF INDEPENDENCE: STAIR NEGOTIATION, REHAB EVAL
unable to assess due to environmental barriers in ASU, reviewed proper stair negotiation with patient, not utilizing right handrail for step negotiation due to surgical precautions

## 2025-03-06 NOTE — PHYSICAL THERAPY INITIAL EVALUATION ADULT - RANGE OF MOTION EXAMINATION, REHAB EVAL
Right shoulder not assessed due to surgical precautions, hand/wrist grossly WFL/Left UE ROM was WNL (within normal limits)/bilateral lower extremity was ROM was WNL (within normal limits)

## 2025-03-06 NOTE — OCCUPATIONAL THERAPY INITIAL EVALUATION ADULT - RANGE OF MOTION EXAMINATION, UPPER EXTREMITY
Right shoulder not assessed due to surgical precautions, hand/wrist grossly WFL/Left UE Active ROM was WFL (within functional limits)

## 2025-03-06 NOTE — PHYSICAL THERAPY INITIAL EVALUATION ADULT - ADDITIONAL COMMENTS
Patient lives in a private home with her sister with 4 steps to enter with bilateral handrails, +flight up to bedroom with right handrail up and left handrail down. Patient ambulatory without a device inside the home, utilizes a cane for long distances.  Denies any falls, reports being fully independent.    Patient left in chair, NAD, all lines and tubes intact, RN Khadijah aware of PT

## 2025-03-06 NOTE — PACU DISCHARGE NOTE - PAIN:
Provided telephone / virtual visit for Diabetes education  Very difficult to provide adequate/ complete education  Patient had many questions and comments that were "off topic", and not sure she comprehended much of what we discussed  She is interested in more teaching, but do not feel she is appropriate for group class setting  Would suggest continued individual teaching  Controlled with current regime

## 2025-03-06 NOTE — ASU DISCHARGE PLAN (ADULT/PEDIATRIC) - NURSING INSTRUCTIONS
You received IV Tylenol for pain management at 10:15am__. Please DO NOT take any Tylenol (Acetaminophen) containing products, such as Vicodin, Percocet, Excedrin, and cold medications for the next 6 hours (until 4:15_ PM). DO NOT TAKE MORE THAN 4000 MG OF TYLENOL in a 24 hour period.  You received IV Toradol for pain management at _1:10pm__. Please DO NOT take Motrin/Ibuprofen/Advil/Aleve/NSAIDs (Non-Steroidal Anti-Inflammatory Drugs) for the next 6 hours (until _7:00 PM). You received IV Tylenol for pain management at 10:15am__. Please DO NOT take any Tylenol (Acetaminophen) containing products, such as Vicodin, Percocet, Excedrin, and cold medications for the next 6 hours (until 4:15_ PM). DO NOT TAKE MORE THAN 4000 MG OF TYLENOL in a 24 hour period.  You received oxycodone @ 1250, do not take next dose until 750pm.  You received IV Toradol for pain management at _1:10pm__. Please DO NOT take Motrin/Ibuprofen/Advil/Aleve/NSAIDs (Non-Steroidal Anti-Inflammatory Drugs) for the next 6 hours (until _7:00 PM).

## 2025-03-06 NOTE — OCCUPATIONAL THERAPY INITIAL EVALUATION ADULT - GENERAL OBSERVATIONS, REHAB EVAL
No fractures found  Likely muscle strain or contusion of lower left leg  Ice, Tylenol and Ace wrap of left lower extremity as needed  Patient may need assistance in ambulation  Follow-up with PCP if not better in 4-5 days 
Patient received semisupine in bed in NAD; agreeable to participate in OT evaluation. +right UE in sling. Daughter at bedside. /68 mmHg.

## 2025-03-06 NOTE — PROGRESS NOTE ADULT - SUBJECTIVE AND OBJECTIVE BOX
ORTHO POC    Patient resting comfortably without complaint s/p right reverse TSA today      Vital Signs Last 24 Hrs  T(C): 36.3 (06 Mar 2025 10:50), Max: 37 (06 Mar 2025 06:27)  T(F): 97.4 (06 Mar 2025 10:50), Max: 98.6 (06 Mar 2025 06:27)  HR: 90 (06 Mar 2025 12:45) (87 - 93)  BP: 119/71 (06 Mar 2025 12:45) (106/74 - 145/115)  BP(mean): 87 (06 Mar 2025 12:45) (81 - 125)  RR: 22 (06 Mar 2025 12:45) (12 - 22)  SpO2: 96% (06 Mar 2025 12:45) (95% - 100%)    Parameters below as of 06 Mar 2025 11:30  Patient On (Oxygen Delivery Method): room air          right Shoulder:  Dressing clean/ dry/intact                       Median/ Ulnar/Radial nerve function grossly intact motor and sensory 2/2 block                      Radial pulse 2+     U/O: DTV        A/P Stable postop           PT- nonweight bearing right upper extremity                 NO external rotation                 NO AAROM                 NO PROM           Pain control          DVT prophylaxis- ASA daily/ venodynes           DC planning

## 2025-03-06 NOTE — ASU DISCHARGE PLAN (ADULT/PEDIATRIC) - FINANCIAL ASSISTANCE
[facility] provides services at a reduced cost to those who are determined to be eligible through [facility]’s financial assistance program. Information regarding [facility]’s financial assistance program can be found by going to [email or web address] or by calling [phone].

## 2025-03-06 NOTE — OCCUPATIONAL THERAPY INITIAL EVALUATION ADULT - DIAGNOSIS, OT EVAL
s/p right reversal total shoulder replacement; decreased functional mobility, decreased ADL performance

## 2025-03-06 NOTE — OCCUPATIONAL THERAPY INITIAL EVALUATION ADULT - NSOTDISCHREC_GEN_A_CORE
once cleared by MD for management of right shoulder replacement. Recommending supervision/assistance as needed with ADLs which patient reports sister can provide. OT to continue to follow./Outpatient OT

## 2025-03-06 NOTE — ASU PREOP CHECKLIST - PATIENT SENT TO
additional history taking/direct patient care (not related to procedure)/documentation/interpretation of diagnostic studies/consultation with other physicians
operating room

## 2025-03-06 NOTE — ASU DISCHARGE PLAN (ADULT/PEDIATRIC) - CARE PROVIDER_API CALL
Emiliano Fragoso  Orthopaedic Surgery  [address], [address]  [place] [address]  Phone: [phone]  Fax: [phone]  Follow Up Time: 2 weeks

## 2025-03-06 NOTE — PHYSICAL THERAPY INITIAL EVALUATION ADULT - NSPTDISCHREC_GEN_A_CORE
Patient has safely demonstrated the ability to perform ambulation in PACU is functionally cleared from Physical Therapy perspective. home and patient to follow up for outpatient PT script at surgeon's office upon follow up visit./Outpatient PT

## 2025-03-06 NOTE — ASU DISCHARGE PLAN (ADULT/PEDIATRIC) - ASU DC SPECIAL INSTRUCTIONSFT
FOLLOW UP: Please follow up with your surgeon in 2 weeks, call to make an appt.  DIET: You may resume your regular diet. Narcotic pain medicine can cause extreme nausea and constipation. Drink plenty of water and take stool softeners (colace, Miralax) as needed. You can get them from your local pharmacy.  WOUND CARE:  Please keep incisions clean and dry. Please do not scrub or rub incisions. Do not use lotion or powder on incisions.  BATHING: Keep your surgical area clean and dry. You may shower or use sponge bath to bathe.  Do not submerge wound underwater. KEEP dressing on until your appointment.  PAIN CONTROL:  Alternate between taking Ibuprofen and Tylenol so you are taking pain medication every 3 to 4 hours. You should take oxycodone in addition to this only if tylenol and ibuprofen are not working for your pain. Do not take tramadol and oxycodone at the same time, use tramadol for mild to moderate pain and oxycodone for moderate to severe pain.    It is important to ice and elevate your arm to keep swelling down and the pain manageable. Keep the ice on for 20 minutes, and then keep off for 20 minutes. Repeat while awake.  MEDICATIONS: Take all medications as prescribed. It is important to take your blood thinner medication to prevent dangerous blood clots (deep vein thrombosis).   ACTIVITY: No heavy lifting or straining for 2 weeks.  SLing must be worn at all times with no range of motion at the shoulder. If you are taking narcotic pain medication (such as vicodin, oxycodone, or Percocet) DO NOT drive a car, operate machinery or make important decisions.  NOTIFY YOUR SURGEON IF: You have any bleeding that does not stop, any pus draining from your wound(s), any fever (over 100.4 F) or chills, persistent nausea/vomiting, persistent diarrhea, or if your pain is not controlled on your discharge pain medications.

## 2025-03-06 NOTE — PHYSICAL THERAPY INITIAL EVALUATION ADULT - PRECAUTIONS/LIMITATIONS, REHAB EVAL
fall precautions; surgical precautions; no right shoulder AAROM, PROM, external rotation/fall precautions/surgical precautions

## 2025-03-06 NOTE — PHYSICAL THERAPY INITIAL EVALUATION ADULT - GENERAL OBSERVATIONS, REHAB EVAL
Patient found sitting in recliner in NAD, A&Ox4, patient OK for PT per RN Khadijah, agreeable to participate in PT evaluation. +right UE in sling. Daughter at bedside. /68

## 2025-03-06 NOTE — OCCUPATIONAL THERAPY INITIAL EVALUATION ADULT - PERTINENT HX OF CURRENT PROBLEM, REHAB EVAL
79 year old female with history of HTN, HLD, hypothyroidism, Breast cancer right (s/p lumpectomy and radiation 2017) presents s/p right reversal total shoulder replacement on 3/6/25.

## 2025-03-11 PROBLEM — N32.81 OVERACTIVE BLADDER: Chronic | Status: ACTIVE | Noted: 2025-02-14

## 2025-03-14 ENCOUNTER — NON-APPOINTMENT (OUTPATIENT)
Age: 80
End: 2025-03-14

## 2025-03-19 ENCOUNTER — APPOINTMENT (OUTPATIENT)
Dept: ORTHOPEDIC SURGERY | Facility: CLINIC | Age: 80
End: 2025-03-19

## 2025-03-19 ENCOUNTER — APPOINTMENT (OUTPATIENT)
Dept: ORTHOPEDIC SURGERY | Facility: CLINIC | Age: 80
End: 2025-03-19
Payer: MEDICARE

## 2025-03-19 VITALS
BODY MASS INDEX: 32.49 KG/M2 | DIASTOLIC BLOOD PRESSURE: 70 MMHG | SYSTOLIC BLOOD PRESSURE: 112 MMHG | HEIGHT: 65 IN | WEIGHT: 195 LBS

## 2025-03-19 DIAGNOSIS — Z96.611 PRESENCE OF RIGHT ARTIFICIAL SHOULDER JOINT: ICD-10-CM

## 2025-03-19 PROCEDURE — 99024 POSTOP FOLLOW-UP VISIT: CPT

## 2025-04-14 ENCOUNTER — NON-APPOINTMENT (OUTPATIENT)
Age: 80
End: 2025-04-14

## 2025-04-16 ENCOUNTER — APPOINTMENT (OUTPATIENT)
Dept: ORTHOPEDIC SURGERY | Facility: CLINIC | Age: 80
End: 2025-04-16
Payer: MEDICARE

## 2025-04-16 VITALS — BODY MASS INDEX: 32.49 KG/M2 | WEIGHT: 195 LBS | HEIGHT: 65 IN

## 2025-04-16 DIAGNOSIS — Z96.611 PRESENCE OF RIGHT ARTIFICIAL SHOULDER JOINT: ICD-10-CM

## 2025-04-16 PROCEDURE — 99024 POSTOP FOLLOW-UP VISIT: CPT

## 2025-04-24 ENCOUNTER — NON-APPOINTMENT (OUTPATIENT)
Age: 80
End: 2025-04-24

## 2025-05-20 ENCOUNTER — NON-APPOINTMENT (OUTPATIENT)
Age: 80
End: 2025-05-20

## 2025-05-23 ENCOUNTER — APPOINTMENT (OUTPATIENT)
Dept: ORTHOPEDIC SURGERY | Facility: CLINIC | Age: 80
End: 2025-05-23
Payer: MEDICARE

## 2025-05-23 VITALS — BODY MASS INDEX: 32.49 KG/M2 | HEIGHT: 65 IN | WEIGHT: 195 LBS

## 2025-05-23 DIAGNOSIS — Z96.611 PRESENCE OF RIGHT ARTIFICIAL SHOULDER JOINT: ICD-10-CM

## 2025-05-23 PROCEDURE — 73030 X-RAY EXAM OF SHOULDER: CPT | Mod: RT

## 2025-05-23 PROCEDURE — 99024 POSTOP FOLLOW-UP VISIT: CPT

## 2025-06-06 ENCOUNTER — NON-APPOINTMENT (OUTPATIENT)
Age: 80
End: 2025-06-06

## 2025-07-24 ENCOUNTER — APPOINTMENT (OUTPATIENT)
Dept: ORTHOPEDIC SURGERY | Facility: CLINIC | Age: 80
End: 2025-07-24
Payer: MEDICARE

## 2025-07-24 VITALS — BODY MASS INDEX: 31.65 KG/M2 | WEIGHT: 190 LBS | HEIGHT: 65 IN

## 2025-07-24 DIAGNOSIS — Z96.611 PRESENCE OF RIGHT ARTIFICIAL SHOULDER JOINT: ICD-10-CM

## 2025-07-24 PROCEDURE — 99213 OFFICE O/P EST LOW 20 MIN: CPT

## 2025-08-04 ENCOUNTER — NON-APPOINTMENT (OUTPATIENT)
Age: 80
End: 2025-08-04

## (undated) DEVICE — DRSG STERISTRIPS 0.5 X 4"

## (undated) DEVICE — MAKO BLADE NARROW

## (undated) DEVICE — GLV 8.5 PROTEXIS (WHITE)

## (undated) DEVICE — SOL INJ NS 0.9% 1000ML

## (undated) DEVICE — MAKO VIZADISC KNEE TRACKING KIT

## (undated) DEVICE — MIDAS REX MR8 BALL FLUTED LG BORE 6MM X 9CM

## (undated) DEVICE — SLING SHOULDER IMMOBILIZER CLINIC LARGE

## (undated) DEVICE — SOL IRR POUR NS 0.9% 500ML

## (undated) DEVICE — SOL IRR POUR H2O 1500ML

## (undated) DEVICE — FOLEY TRAY 16FR 5CC LTX UMETER CLOSED

## (undated) DEVICE — GLV 7 PROTEXIS (WHITE)

## (undated) DEVICE — HOOD FLYTE STRYKER HELMET SHIELD

## (undated) DEVICE — POSITIONER FOAM EGG CRATE ULNAR 2PCS (PINK)

## (undated) DEVICE — SYR TOOMEY 50ML

## (undated) DEVICE — PACK LIJ BASIC ORTHO

## (undated) DEVICE — DRILL BIT BIOMET 2.7MM

## (undated) DEVICE — SOL IRR BAG NS 0.9% 3000ML

## (undated) DEVICE — SUT STRATAFIX SYMMETRIC PDS PLUS 1 18" CTX VIOLET

## (undated) DEVICE — SUT POLYSORB 2-0 30" GS-21 UNDYED

## (undated) DEVICE — SAW BLADE STRYKER SAGITTAL AGGRESSIVE 25X86.5X1.32MM

## (undated) DEVICE — MEDICATION LABELS W MARKER

## (undated) DEVICE — MAKO BLADE STANDARD

## (undated) DEVICE — DRAPE TOWEL BLUE 17" X 24"

## (undated) DEVICE — DRILL BIT BIOMET 3.2MM

## (undated) DEVICE — GLV 8 PROTEXIS (WHITE)

## (undated) DEVICE — GOWN TRIMAX LG

## (undated) DEVICE — DRSG WEBRIL 6"

## (undated) DEVICE — PACK TOTAL KNEE (2 PACKS)

## (undated) DEVICE — HOOD FLYTE STRYKER SURGICOOL W PEELAWAY

## (undated) DEVICE — DRSG STOCKINETTE IMPERVIOUS XL 12 X 48"

## (undated) DEVICE — DRAPE MAYO STAND 23"

## (undated) DEVICE — SUT VICRYL PLUS 2-0 27" FS-1 UNDYED

## (undated) DEVICE — SOL BETADINE POUCH 0.75OZ STERILE

## (undated) DEVICE — DRSG DERMABOND 0.7ML

## (undated) DEVICE — SAW BLADE STRYKER SAGITTAL 3 HOLE OSCILLATING

## (undated) DEVICE — POSITIONER CARDIAC BUMP

## (undated) DEVICE — SUT ETHIBOND 5 4-30" CCS

## (undated) DEVICE — SOL IRR POUR NS 0.9% 1000ML

## (undated) DEVICE — SOL INJ NS 0.9% 500ML 1-PORT

## (undated) DEVICE — WARMING BLANKET UPPER ADULT

## (undated) DEVICE — BIOMET DRILL WITH STOP 2.7MM

## (undated) DEVICE — MAKO DRAPE KIT

## (undated) DEVICE — SUT PDO 2 1/2 CIRCLE 40MM NDL 45CM

## (undated) DEVICE — LABELS BLANK W PEN

## (undated) DEVICE — DRAPE OVERHEAD TABLE COVER 80X90"

## (undated) DEVICE — SUT QUILL MONODERM 0 1/2 CIRCLE TAPR 45CM 26MM

## (undated) DEVICE — PACK MIS KNEE (1 PIECE)

## (undated) DEVICE — SAW BLADE STRYKER SAGITTAL EXTRA WIDE THIN SHORT

## (undated) DEVICE — DRSG ACE BANDAGE 6"

## (undated) DEVICE — DRSG TAPE TRANSPORE 3"

## (undated) DEVICE — PACK TOTAL JOINT

## (undated) DEVICE — DRAPE 3/4 SHEET 52X76"

## (undated) DEVICE — GLV 9 DURAPRENE

## (undated) DEVICE — SUCTION YANKAUER NO CONTROL VENT

## (undated) DEVICE — SPECIMEN CONTAINER 100ML

## (undated) DEVICE — SUT VICRYL PLUS 0 27" OS-6 UNDYED

## (undated) DEVICE — DRAPE SPLIT SHEET 77" X 120"

## (undated) DEVICE — STRYKER INTERPULSE HANDPIECE W IRR SUCTION TUBE

## (undated) DEVICE — VENODYNE/SCD SLEEVE CALF LARGE

## (undated) DEVICE — ELCTR BOVIE PENCIL SMOKE EVACUATION

## (undated) DEVICE — Device

## (undated) DEVICE — DRSG AQUACEL 3.5 X 10"

## (undated) DEVICE — SPLINT IMMOBILIZER 3-PANEL KNEE 20"

## (undated) DEVICE — SUT POLYSORB 0 30" GS-21 UNDYED

## (undated) DEVICE — SUT MONOCRYL 4-0 27" PS-2 UNDYED

## (undated) DEVICE — LIJ-BIOMET VERSA-DIAL HUMERAL HEADS: Type: DURABLE MEDICAL EQUIPMENT

## (undated) DEVICE — SUT VICRYL 1 36" CT-1 UNDYED

## (undated) DEVICE — WARMING BLANKET LOWER ADULT

## (undated) DEVICE — TUBING TUR 2 PRONG

## (undated) DEVICE — DRAPE MAYO STAND 30"

## (undated) DEVICE — SAW BLADE STRYKER SAGITTAL 81.5X12.5X1.19MM

## (undated) DEVICE — VENODYNE/SCD SLEEVE CALF MEDIUM

## (undated) DEVICE — MAKO CHECKPOINT KIT FEMORAL / TIBIAL

## (undated) DEVICE — DRSG AQUACEL 3.5 X 12"

## (undated) DEVICE — GLV 7.5 PROTEXIS (WHITE)

## (undated) DEVICE — BAG DECANTER IV STERILE

## (undated) DEVICE — DRSG XEROFORM 1 X 8"

## (undated) DEVICE — SOL IRR POUR H2O 250ML

## (undated) DEVICE — STRYKER MIXEVAC 3 BONE CEMENT MIXER

## (undated) DEVICE — SUT QUILL MONODERM 2-0 3/8 CIRCLE 45CM

## (undated) DEVICE — SYR LUER LOK 20CC

## (undated) DEVICE — SUT MONOCRYL 2-0 27" SH UNDYED

## (undated) DEVICE — ELCTR GROUNDING PAD ADULT COVIDIEN

## (undated) DEVICE — TOURNIQUET CUFF 34" DUAL PORT W PLC

## (undated) DEVICE — DRAIN JACKSON PRATT 3 SPRING RESERVOIR W 10FR PVC DRAIN

## (undated) DEVICE — SUT VICRYL 1 27" CPX UNDYED

## (undated) DEVICE — GLV 8 PROTEXIS (CREAM) MICRO

## (undated) DEVICE — DRAPE IOBAN 33" X 23"

## (undated) DEVICE — GLV 8.5 PROTEXIS (BLUE)

## (undated) DEVICE — DRAPE SURGICAL #1010

## (undated) DEVICE — GLV 6.5 PROTEXIS (WHITE)

## (undated) DEVICE — DRSG TAPE MICROFOAM 3"

## (undated) DEVICE — EXTRACTOR KNEE REVISION

## (undated) DEVICE — NDL HYPO SAFE 22G X 1.5" (BLACK)

## (undated) DEVICE — POSITIONER STRAP ARMBOARD VELCRO TS-30

## (undated) DEVICE — DRAPE INSTRUMENT POUCH 6.75" X 11"

## (undated) DEVICE — SUT POLYSORB 1 36" GS-21 UNDYED

## (undated) DEVICE — STRYKER PULSE LAVAGE WITH COAXIAL FAN SPRAY TIP

## (undated) DEVICE — SAW BLADE STRYKER SAGITTAL DUAL CUT 64X35X.89MM